# Patient Record
Sex: FEMALE | Race: WHITE | NOT HISPANIC OR LATINO | Employment: OTHER | URBAN - METROPOLITAN AREA
[De-identification: names, ages, dates, MRNs, and addresses within clinical notes are randomized per-mention and may not be internally consistent; named-entity substitution may affect disease eponyms.]

---

## 2021-05-26 ENCOUNTER — RECORDS - HEALTHEAST (OUTPATIENT)
Dept: ADMINISTRATIVE | Facility: CLINIC | Age: 66
End: 2021-05-26

## 2021-05-29 ENCOUNTER — RECORDS - HEALTHEAST (OUTPATIENT)
Dept: ADMINISTRATIVE | Facility: CLINIC | Age: 66
End: 2021-05-29

## 2021-07-01 ENCOUNTER — RECORDS - HEALTHEAST (OUTPATIENT)
Dept: LAB | Facility: CLINIC | Age: 66
End: 2021-07-01

## 2021-07-01 LAB
SARS-COV-2 PCR COMMENT: NORMAL
SARS-COV-2 RNA SPEC QL NAA+PROBE: NEGATIVE
SARS-COV-2 VIRUS SPECIMEN SOURCE: NORMAL

## 2021-07-02 LAB
ALBUMIN SERPL-MCNC: 3.5 G/DL (ref 3.5–5)
ALP SERPL-CCNC: 81 U/L (ref 45–120)
ALT SERPL W P-5'-P-CCNC: 32 U/L (ref 0–45)
ANION GAP SERPL CALCULATED.3IONS-SCNC: 11 MMOL/L (ref 5–18)
AST SERPL W P-5'-P-CCNC: 37 U/L (ref 0–40)
BILIRUB DIRECT SERPL-MCNC: 0.1 MG/DL
BILIRUB SERPL-MCNC: 0.3 MG/DL (ref 0–1)
BUN SERPL-MCNC: 15 MG/DL (ref 8–22)
CALCIUM SERPL-MCNC: 9.1 MG/DL (ref 8.5–10.5)
CHLORIDE BLD-SCNC: 105 MMOL/L (ref 98–107)
CO2 SERPL-SCNC: 26 MMOL/L (ref 22–31)
CREAT SERPL-MCNC: 0.73 MG/DL (ref 0.6–1.1)
ERYTHROCYTE [DISTWIDTH] IN BLOOD BY AUTOMATED COUNT: 13.3 % (ref 11–14.5)
GFR SERPL CREATININE-BSD FRML MDRD: >60 ML/MIN/1.73M2
GLUCOSE BLD-MCNC: 67 MG/DL (ref 70–125)
HCT VFR BLD AUTO: 34.4 % (ref 35–47)
HGB BLD-MCNC: 10.5 G/DL (ref 12–16)
MCH RBC QN AUTO: 29.2 PG (ref 27–34)
MCHC RBC AUTO-ENTMCNC: 30.5 G/DL (ref 32–36)
MCV RBC AUTO: 96 FL (ref 80–100)
PLATELET # BLD AUTO: 160 THOU/UL (ref 140–440)
PMV BLD AUTO: 12.1 FL (ref 8.5–12.5)
POTASSIUM BLD-SCNC: 3.2 MMOL/L (ref 3.5–5)
PROT SERPL-MCNC: 5.6 G/DL (ref 6–8)
RBC # BLD AUTO: 3.59 MILL/UL (ref 3.8–5.4)
SODIUM SERPL-SCNC: 142 MMOL/L (ref 136–145)
WBC: 6.9 THOU/UL (ref 4–11)

## 2021-07-04 LAB — 25(OH)D3 SERPL-MCNC: 32.3 NG/ML (ref 30–80)

## 2021-07-11 ENCOUNTER — LAB REQUISITION (OUTPATIENT)
Dept: LAB | Facility: CLINIC | Age: 66
End: 2021-07-11
Payer: COMMERCIAL

## 2021-07-11 DIAGNOSIS — I10 ESSENTIAL (PRIMARY) HYPERTENSION: ICD-10-CM

## 2022-06-15 NOTE — TELEPHONE ENCOUNTER
DIAGNOSIS: Spinal Stenosis / MRI at [facility], will have pushed over to [facility] / Self[employer]Friend / [employer]   APPOINTMENT DATE: 6.22.22   NOTES STATUS DETAILS   OFFICE NOTE from referring provider N/A    OFFICE NOTE from other specialist Care Everywhere 3.18.22 Willam, [facility]  12.13.21 Campe [facility]  11.2.21 Raquel, [facility]  10.29.19 Ron, [facility]  4.1.19 [facility] (scoliosis)  2.12.18, 2.5.18, 1.24.18 Beste, [facility]  9.5.17 Reut, [facility]  9.5.17 [facility]  More in CE if needed   DISCHARGE SUMMARY from hospital N/A    DISCHARGE REPORT from the ER Care Everywhere 6.24.21 [facility]   OPERATIVE REPORT N/A    MEDICATION LIST Care Everywhere    EMG (for Spine) N/A    IMPLANT RECORD/STICKER N/A    LABS     CBC/DIFF Care Everywhere    CULTURES N/A    INJECTIONS DONE IN RADIOLOGY N/A    Dexa In process 9.26.18 Bone denisty axil skeleton     MRI PACS 10.28.20 MR spine lumbar, [facility]  4.24.19 MR spine lumbar, [facility]  2.18.16 MR spine lumbar, [facility]  1.9.13 MR spine lumbar, [facility]     CT SCAN PACS 6.24.21 CT spine cervical, [facility]   XRAYS (IMAGES & REPORTS) PACS        [facility]: In Process [facility]  6.24.21 XR clavicle L  6.24.21 XR L shoulder  6.24.21 XR chest  4.1.19 XR spine cervical  4.1.19 XR spine thoracic  4.1.19 XR chest  9.30.15 XR spine lumbar  2.13.04 XR lumbar spine, [facility]  3.8.03 XR spine lumbar, [facility]   TUMOR     PATHOLOGY  Slides & report N/A      Action 6.15.22 MJ   Action Taken Sent request to [facility] and Johanna for all spine/back images.      Action June 21, 2022 3:27 PM MT   Action Taken CSS recvd and resolved imgs to PACS.      Action June 22, 2022 9:06 AM MT   Action Taken CSS called [facility] and [facility] for rep to push images STAT.

## 2022-06-16 DIAGNOSIS — M54.9 BACK PAIN: Primary | ICD-10-CM

## 2022-06-22 ENCOUNTER — PRE VISIT (OUTPATIENT)
Dept: ORTHOPEDICS | Facility: CLINIC | Age: 67
End: 2022-06-22
Payer: COMMERCIAL

## 2022-06-22 ENCOUNTER — ANCILLARY PROCEDURE (OUTPATIENT)
Dept: GENERAL RADIOLOGY | Facility: CLINIC | Age: 67
End: 2022-06-22
Attending: ORTHOPAEDIC SURGERY
Payer: COMMERCIAL

## 2022-06-22 ENCOUNTER — OFFICE VISIT (OUTPATIENT)
Dept: ORTHOPEDICS | Facility: CLINIC | Age: 67
End: 2022-06-22
Payer: COMMERCIAL

## 2022-06-22 VITALS — HEIGHT: 59 IN | WEIGHT: 146 LBS | BODY MASS INDEX: 29.43 KG/M2

## 2022-06-22 DIAGNOSIS — M41.25 OTHER IDIOPATHIC SCOLIOSIS, THORACOLUMBAR REGION: ICD-10-CM

## 2022-06-22 DIAGNOSIS — M54.16 LUMBAR RADICULOPATHY: ICD-10-CM

## 2022-06-22 DIAGNOSIS — M43.8X9 CORONAL PLANE IMBALANCE: ICD-10-CM

## 2022-06-22 DIAGNOSIS — M43.8X9 SAGITTAL PLANE IMBALANCE: ICD-10-CM

## 2022-06-22 DIAGNOSIS — F40.240 CLAUSTROPHOBIA: ICD-10-CM

## 2022-06-22 DIAGNOSIS — M54.16 LUMBAR RADICULOPATHY: Primary | ICD-10-CM

## 2022-06-22 PROCEDURE — 99205 OFFICE O/P NEW HI 60 MIN: CPT | Performed by: ORTHOPAEDIC SURGERY

## 2022-06-22 PROCEDURE — 77073 BONE LENGTH STUDIES: CPT | Performed by: STUDENT IN AN ORGANIZED HEALTH CARE EDUCATION/TRAINING PROGRAM

## 2022-06-22 PROCEDURE — 72082 X-RAY EXAM ENTIRE SPI 2/3 VW: CPT | Performed by: STUDENT IN AN ORGANIZED HEALTH CARE EDUCATION/TRAINING PROGRAM

## 2022-06-22 RX ORDER — NITROGLYCERIN 0.4 MG/1
TABLET SUBLINGUAL
COMMUNITY
Start: 2022-03-14

## 2022-06-22 RX ORDER — LANOLIN ALCOHOL/MO/W.PET/CERES
3 CREAM (GRAM) TOPICAL
COMMUNITY
Start: 2021-06-29

## 2022-06-22 RX ORDER — AMLODIPINE BESYLATE 5 MG/1
TABLET ORAL
COMMUNITY
Start: 2022-04-07

## 2022-06-22 RX ORDER — ATORVASTATIN CALCIUM 20 MG/1
TABLET, FILM COATED ORAL
COMMUNITY
Start: 2022-04-07

## 2022-06-22 RX ORDER — CYCLOBENZAPRINE HCL 10 MG
TABLET ORAL
COMMUNITY
Start: 2022-06-09

## 2022-06-22 RX ORDER — FERROUS SULFATE 325(65) MG
325 TABLET ORAL
COMMUNITY
Start: 2021-08-30

## 2022-06-22 RX ORDER — AMLODIPINE BESYLATE 5 MG/1
5 TABLET ORAL DAILY
COMMUNITY
Start: 2021-08-30

## 2022-06-22 NOTE — LETTER
6/22/2022         RE: Cynthia Chawla  [address]  [address]  [facility] [address]        Dear Colleague,    Thank you for referring your patient, Cynthia Chawla, to the [facility] [place]. Please see a copy of my visit note below.    Spine Surgery Consultation    REFERRING PHYSICIAN: Referred Self   PRIMARY CARE PHYSICIAN: Em Kaufman           Chief Complaint:   Consult (Spinal Stenosis low back pain referred by friend)      History of Present Illness:  Symptom Profile Including: location of symptoms, onset, severity, exacerbating/alleviating factors, previous treatments:        Cynthia Chawla is a 66 year old female who presents today for evaluation of chronic back pain and radicular burning pain in the right L4 and L5 distributions. She has longstanding scoliosis which was not treated.  She is a current smoker who is switching to nicotine replacement therapy. She is on methadone for chronic pain.  She reports pain for over seven years with progressively worsening symptoms over past one year.  She has had 2 injections; first was very helpful however the most recent injection provided no relief.  She has had acupuncture. She uses a scooter or walker to get around due to pain and sagittal imbalance making it difficult to walk without assistive device.     Patient denies saddle anesthesia, loss of bowel or bladder control.              Past Medical History:     Past Medical History:   Diagnosis Date     Chronic pain      COPD (chronic obstructive pulmonary disease) (H)      DJD (degenerative joint disease)      Hepatitis      Major depression, recurrent (H)      Sleep apnea     have C-PAP,But don't really use it.            Past Surgical History:     Past Surgical History:   Procedure Laterality Date     APPENDECTOMY       COLONOSCOPY       GYN SURGERY      tubal ligation     HEAD & NECK SURGERY      teeth ext     RECESSION RESECTION (REPAIR STRABISMUS) Right  10/22/2015    Procedure: RECESSION RESECTION (REPAIR STRABISMUS);  Surgeon: Hilario Hills MD;  Location: [facility]            Social History:     Social History     Tobacco Use     Smoking status: Former Smoker     Packs/day: 1.00     Types: Cigarettes     Quit date: 2013     Years since quittin.4     Smokeless tobacco: Never Used   Substance Use Topics     Alcohol use: No            Family History:     Family History   Problem Relation Age of Onset     Cardiovascular Mother      Cardiovascular Maternal Grandfather      Asthma Son             Allergies:     Allergies   Allergen Reactions     Nefazodone      Nsaids      Has GFR <60     Tramadol      Seizures     Trazodone             Medications:     Current Outpatient Medications   Medication     Acetaminophen (TYLENOL PO)     albuterol (PROAIR HFA, PROVENTIL HFA, VENTOLIN HFA) 108 (90 BASE) MCG/ACT inhaler     amLODIPine (NORVASC) 5 MG tablet     amLODIPine (NORVASC) 5 MG tablet     atorvastatin (LIPITOR) 20 MG tablet     calcium carb 1250 mg, 500 mg [place],/vitamin D 200 units (OSCAL WITH D) 500-200 MG-UNIT per tablet     cyclobenzaprine (FLEXERIL) 10 MG tablet     ferrous sulfate (FEROSUL) 325 (65 Fe) MG tablet     Gabapentin (NEURONTIN PO)     IBUPROFEN 800 MG OR TABS     melatonin 3 MG tablet     Methadone HCl (METHADOSE PO)     nitroGLYcerin (NITROSTAT) 0.4 MG sublingual tablet     VITAMIN D, CHOLECALCIFEROL, PO     BuPROPion HCl (WELLBUTRIN XL PO)     desonide (DESOWEN) 0.05 % ointment     diazepam (VALIUM) 5 MG tablet     ipratropium - albuterol 0.5 mg/2.5 mg/3 mL (DUONEB) 0.5-2.5 (3) MG/3ML nebulization     mometasone-formoterol (DULERA) 200-5 MCG/ACT oral inhaler     XANAX 1 MG OR TABS     No current facility-administered medications for this visit.             Review of Systems:     A 10 point ROS was performed and reviewed. Specific responses to these questions are noted at the end of the document.         Physical Exam:     PHYSICAL  "EXAM:   Constitutional - Patient is healthy, well-nourished and appears stated age, is in no acute distress    Vitals: Ht 1.506 m (4' 11.29\")   Wt 66.2 kg (146 lb)   LMP 04/20/2008   BMI 29.20 kg/m     She has obvious rightward coronal imbalance and markedly positive sagittal balance. She does not have any focal weakness in lower extremities. She has subjective decreased sensation in right L4 and L5. Reflexes are 1+ patella tendon and achilles tendon. No sustained clonus. Negative SLR. She doesn't walk without walker. Can't walk on heels or toes.          Imaging:   We ordered and independently reviewed new radiographs at this clinic visit. The results were discussed with the patient.  Findings include:    EOS full length radiographs show a severe short lumbar scoliosis at L1-4 measuring 123 degrees. Compensatory thoracic curve of 41 degrees. Coronal imbalance of 113 mm. Lateral shows acute kyphotic deformity at thoracolumbar junction measuring 50 degrees. 13 cm positive sagittal balance.                  Assessment and Plan:   Assessment:  66 year old female with scoliosis and decompensated sagittal and coronal imbalance. She has radicular symptoms as well as marked disability due to her decompensated imbalance. Recommended MRI to evaluate for focal compressive lesion which may be amenable to nonoperative intervention or smaller decompression surgery if she fails conservative cares. Her sagittal and coronal imbalance is marked and she would need a great deal of optimization were she to desire pursuing deformity reconstruction. This would include bone health, nutrition, tobacco, therapy     Plan:  1. MRI  2. Will help coordinate nonoperative cares for radic  3. Will follow up in 2 weeks to review imaging findings.     Time spent on this clinical encounter including previsit chart review, history and physical examination, patient counseling and documentation was 45 minutes on the date of " encounter.          Respectfully,  Marco A Adair MD  Spine Surgery  [facility]

## 2022-06-22 NOTE — NURSING NOTE
"Reason For Visit:   Chief Complaint   Patient presents with     Consult     Spinal Stenosis low back pain referred by friend       Primary MD: Anay Quarles  Ref. MD: Self referred    ?  No    Date of injury: no  Type of injury: no.    Date of surgery: no  Type of surgery: no.    Smoker: Yes  Request smoking cessation information: No    Ht 1.506 m (4' 11.29\")   Wt 66.2 kg (146 lb)   LMP 04/20/2008   BMI 29.20 kg/m      Pain Assessment  Patient Currently in Pain: Yes  0-10 Pain Scale: 5  Primary Pain Location: Back    Oswestry (ANDREAS) Questionnaire    OSWESTRY DISABILITY INDEX 6/22/2022   Count 9   Sum 34   Oswestry Score (%) 75.56   Some recent data might be hidden        Visual Analog Pain Scale  Back Pain Scale 0-10: 5  Right leg pain: 6  Left leg pain: 0  Neck Pain Scale 0-10: 0  Right arm pain: 0  Left arm pain: 0    Promis 10 Assessment    No flowsheet data found.             Monika Wise LPN  "

## 2022-06-22 NOTE — TELEPHONE ENCOUNTER
Patient was seen today with Dr. Adair. Patient will need MRI.  Patient has claustrophobia. Need valium before procedure.  RN send script to [facility] to sign and authorize.  [facility].    Viraj Marina RN

## 2022-06-23 RX ORDER — DIAZEPAM 5 MG
TABLET ORAL
Qty: 1 TABLET | Refills: 0 | Status: SHIPPED | OUTPATIENT
Start: 2022-06-23 | End: 2022-08-29

## 2022-06-27 PROBLEM — M41.25 OTHER IDIOPATHIC SCOLIOSIS, THORACOLUMBAR REGION: Status: ACTIVE | Noted: 2022-06-27

## 2022-06-27 PROBLEM — M43.8X9 SAGITTAL PLANE IMBALANCE: Status: ACTIVE | Noted: 2022-06-27

## 2022-06-27 PROBLEM — M43.8X9: Status: ACTIVE | Noted: 2022-06-27

## 2022-06-27 PROBLEM — M54.16 LUMBAR RADICULOPATHY: Status: ACTIVE | Noted: 2022-06-27

## 2022-06-27 NOTE — PROGRESS NOTES
Spine Surgery Consultation    REFERRING PHYSICIAN: Referred Self   PRIMARY CARE PHYSICIAN: Em Kaufman           Chief Complaint:   Consult (Spinal Stenosis low back pain referred by friend)      History of Present Illness:  Symptom Profile Including: location of symptoms, onset, severity, exacerbating/alleviating factors, previous treatments:        Cynthia Chawla is a 66 year old female who presents today for evaluation of chronic back pain and radicular burning pain in the right L4 and L5 distributions. She has longstanding scoliosis which was not treated.  She is a current smoker who is switching to nicotine replacement therapy. She is on methadone for chronic pain.  She reports pain for over seven years with progressively worsening symptoms over past one year.  She has had 2 injections; first was very helpful however the most recent injection provided no relief.  She has had acupuncture. She uses a scooter or walker to get around due to pain and sagittal imbalance making it difficult to walk without assistive device.     Patient denies saddle anesthesia, loss of bowel or bladder control.              Past Medical History:     Past Medical History:   Diagnosis Date     Chronic pain      COPD (chronic obstructive pulmonary disease) (H)      DJD (degenerative joint disease)      Hepatitis      Major depression, recurrent (H)      Sleep apnea     have C-PAP,But don't really use it.            Past Surgical History:     Past Surgical History:   Procedure Laterality Date     APPENDECTOMY       COLONOSCOPY       GYN SURGERY      tubal ligation     HEAD & NECK SURGERY      teeth ext     RECESSION RESECTION (REPAIR STRABISMUS) Right 10/22/2015    Procedure: RECESSION RESECTION (REPAIR STRABISMUS);  Surgeon: Hilario Hills MD;  Location: [facility]            Social History:     Social History     Tobacco Use     Smoking status: Former Smoker     Packs/day: 1.00     Types: Cigarettes     Quit date: 1/1/2013  "    Years since quittin.4     Smokeless tobacco: Never Used   Substance Use Topics     Alcohol use: No            Family History:     Family History   Problem Relation Age of Onset     Cardiovascular Mother      Cardiovascular Maternal Grandfather      Asthma Son             Allergies:     Allergies   Allergen Reactions     Nefazodone      Nsaids      Has GFR <60     Tramadol      Seizures     Trazodone             Medications:     Current Outpatient Medications   Medication     Acetaminophen (TYLENOL PO)     albuterol (PROAIR HFA, PROVENTIL HFA, VENTOLIN HFA) 108 (90 BASE) MCG/ACT inhaler     amLODIPine (NORVASC) 5 MG tablet     amLODIPine (NORVASC) 5 MG tablet     atorvastatin (LIPITOR) 20 MG tablet     calcium carb 1250 mg, 500 mg [place],/vitamin D 200 units (OSCAL WITH D) 500-200 MG-UNIT per tablet     cyclobenzaprine (FLEXERIL) 10 MG tablet     ferrous sulfate (FEROSUL) 325 (65 Fe) MG tablet     Gabapentin (NEURONTIN PO)     IBUPROFEN 800 MG OR TABS     melatonin 3 MG tablet     Methadone HCl (METHADOSE PO)     nitroGLYcerin (NITROSTAT) 0.4 MG sublingual tablet     VITAMIN D, CHOLECALCIFEROL, PO     BuPROPion HCl (WELLBUTRIN XL PO)     desonide (DESOWEN) 0.05 % ointment     diazepam (VALIUM) 5 MG tablet     ipratropium - albuterol 0.5 mg/2.5 mg/3 mL (DUONEB) 0.5-2.5 (3) MG/3ML nebulization     mometasone-formoterol (DULERA) 200-5 MCG/ACT oral inhaler     XANAX 1 MG OR TABS     No current facility-administered medications for this visit.             Review of Systems:     A 10 point ROS was performed and reviewed. Specific responses to these questions are noted at the end of the document.         Physical Exam:     PHYSICAL EXAM:   Constitutional - Patient is healthy, well-nourished and appears stated age, is in no acute distress    Vitals: Ht 1.506 m (4' 11.29\")   Wt 66.2 kg (146 lb)   LMP 2008   BMI 29.20 kg/m     She has obvious rightward coronal imbalance and markedly positive sagittal balance. " She does not have any focal weakness in lower extremities. She has subjective decreased sensation in right L4 and L5. Reflexes are 1+ patella tendon and achilles tendon. No sustained clonus. Negative SLR. She doesn't walk without walker. Can't walk on heels or toes.          Imaging:   We ordered and independently reviewed new radiographs at this clinic visit. The results were discussed with the patient.  Findings include:    EOS full length radiographs show a severe short lumbar scoliosis at L1-4 measuring 123 degrees. Compensatory thoracic curve of 41 degrees. Coronal imbalance of 113 mm. Lateral shows acute kyphotic deformity at thoracolumbar junction measuring 50 degrees. 13 cm positive sagittal balance.                  Assessment and Plan:   Assessment:  66 year old female with scoliosis and decompensated sagittal and coronal imbalance. She has radicular symptoms as well as marked disability due to her decompensated imbalance. Recommended MRI to evaluate for focal compressive lesion which may be amenable to nonoperative intervention or smaller decompression surgery if she fails conservative cares. Her sagittal and coronal imbalance is marked and she would need a great deal of optimization were she to desire pursuing deformity reconstruction. This would include bone health, nutrition, tobacco, therapy     Plan:  1. MRI  2. Will help coordinate nonoperative cares for radic  3. Will follow up in 2 weeks to review imaging findings.     Time spent on this clinical encounter including previsit chart review, history and physical examination, patient counseling and documentation was 45 minutes on the date of encounter.          Respectfully,  Marco A Adair MD  Spine Surgery  [facility]

## 2022-07-08 ENCOUNTER — TELEPHONE (OUTPATIENT)
Dept: ORTHOPEDICS | Facility: CLINIC | Age: 67
End: 2022-07-08

## 2022-07-08 NOTE — TELEPHONE ENCOUNTER
Writer called and talked with patient on the phone and rescheduled apt with provider on 7/20/22 to 7/25/22 at 2:20 pm. Provider will be out of clinic on 7/20/22.    Monika Wise LPN

## 2022-07-20 ENCOUNTER — HOSPITAL ENCOUNTER (OUTPATIENT)
Dept: GENERAL RADIOLOGY | Facility: CLINIC | Age: 67
Discharge: HOME | End: 2022-07-20
Attending: ORTHOPAEDIC SURGERY
Payer: COMMERCIAL

## 2022-07-20 ENCOUNTER — ANCILLARY PROCEDURE (OUTPATIENT)
Dept: CARDIOLOGY | Facility: CLINIC | Age: 67
End: 2022-07-20
Attending: INTERNAL MEDICINE
Payer: COMMERCIAL

## 2022-07-20 ENCOUNTER — HOSPITAL ENCOUNTER (OUTPATIENT)
Dept: MRI IMAGING | Facility: CLINIC | Age: 67
Discharge: HOME | End: 2022-07-20
Attending: ORTHOPAEDIC SURGERY
Payer: COMMERCIAL

## 2022-07-20 VITALS — OXYGEN SATURATION: 91 % | HEART RATE: 84 BPM

## 2022-07-20 DIAGNOSIS — Z45.02 ENCOUNTER FOR ADJUSTMENT AND MANAGEMENT OF AUTOMATIC IMPLANTABLE CARDIAC DEFIBRILLATOR: ICD-10-CM

## 2022-07-20 DIAGNOSIS — I44.2 ATRIOVENTRICULAR BLOCK, COMPLETE (H): Primary | ICD-10-CM

## 2022-07-20 DIAGNOSIS — M54.16 LUMBAR RADICULOPATHY: ICD-10-CM

## 2022-07-20 DIAGNOSIS — M41.25 OTHER IDIOPATHIC SCOLIOSIS, THORACOLUMBAR REGION: ICD-10-CM

## 2022-07-20 DIAGNOSIS — I44.2 ATRIOVENTRICULAR BLOCK, COMPLETE (H): ICD-10-CM

## 2022-07-20 LAB
MDC_IDC_LEAD_IMPLANT_DT: NORMAL
MDC_IDC_LEAD_LOCATION: NORMAL
MDC_IDC_LEAD_LOCATION_DETAIL_1: NORMAL
MDC_IDC_LEAD_MFG: NORMAL
MDC_IDC_LEAD_MODEL: NORMAL
MDC_IDC_LEAD_POLARITY_TYPE: NORMAL
MDC_IDC_LEAD_SERIAL: NORMAL
MDC_IDC_LEAD_SPECIAL_FUNCTION: NORMAL
MDC_IDC_MSMT_BATTERY_DTM: NORMAL
MDC_IDC_MSMT_BATTERY_DTM: NORMAL
MDC_IDC_MSMT_BATTERY_REMAINING_LONGEVITY: 107 MO
MDC_IDC_MSMT_BATTERY_REMAINING_LONGEVITY: 109 MO
MDC_IDC_MSMT_BATTERY_RRT_TRIGGER: 2.62
MDC_IDC_MSMT_BATTERY_RRT_TRIGGER: 2.62
MDC_IDC_MSMT_BATTERY_STATUS: NORMAL
MDC_IDC_MSMT_BATTERY_STATUS: NORMAL
MDC_IDC_MSMT_BATTERY_VOLTAGE: 3 V
MDC_IDC_MSMT_BATTERY_VOLTAGE: 3 V
MDC_IDC_MSMT_LEADCHNL_RA_IMPEDANCE_VALUE: 285 OHM
MDC_IDC_MSMT_LEADCHNL_RA_IMPEDANCE_VALUE: 304 OHM
MDC_IDC_MSMT_LEADCHNL_RA_IMPEDANCE_VALUE: 342 OHM
MDC_IDC_MSMT_LEADCHNL_RA_IMPEDANCE_VALUE: 342 OHM
MDC_IDC_MSMT_LEADCHNL_RA_PACING_THRESHOLD_AMPLITUDE: 0.5 V
MDC_IDC_MSMT_LEADCHNL_RA_PACING_THRESHOLD_AMPLITUDE: 0.75 V
MDC_IDC_MSMT_LEADCHNL_RA_PACING_THRESHOLD_PULSEWIDTH: 0.4 MS
MDC_IDC_MSMT_LEADCHNL_RA_PACING_THRESHOLD_PULSEWIDTH: 0.4 MS
MDC_IDC_MSMT_LEADCHNL_RA_SENSING_INTR_AMPL: 1.6 MV
MDC_IDC_MSMT_LEADCHNL_RA_SENSING_INTR_AMPL: 2.1 MV
MDC_IDC_MSMT_LEADCHNL_RV_IMPEDANCE_VALUE: 323 OHM
MDC_IDC_MSMT_LEADCHNL_RV_IMPEDANCE_VALUE: 361 OHM
MDC_IDC_MSMT_LEADCHNL_RV_IMPEDANCE_VALUE: 380 OHM
MDC_IDC_MSMT_LEADCHNL_RV_IMPEDANCE_VALUE: 418 OHM
MDC_IDC_MSMT_LEADCHNL_RV_PACING_THRESHOLD_AMPLITUDE: 1 V
MDC_IDC_MSMT_LEADCHNL_RV_PACING_THRESHOLD_AMPLITUDE: 1 V
MDC_IDC_MSMT_LEADCHNL_RV_PACING_THRESHOLD_PULSEWIDTH: 0.4 MS
MDC_IDC_MSMT_LEADCHNL_RV_PACING_THRESHOLD_PULSEWIDTH: 0.4 MS
MDC_IDC_PG_IMPLANT_DTM: NORMAL
MDC_IDC_PG_IMPLANT_DTM: NORMAL
MDC_IDC_PG_MFG: NORMAL
MDC_IDC_PG_MFG: NORMAL
MDC_IDC_PG_MODEL: NORMAL
MDC_IDC_PG_MODEL: NORMAL
MDC_IDC_PG_SERIAL: NORMAL
MDC_IDC_PG_SERIAL: NORMAL
MDC_IDC_PG_TYPE: NORMAL
MDC_IDC_PG_TYPE: NORMAL
MDC_IDC_SESS_CLINIC_NAME: NORMAL
MDC_IDC_SESS_CLINIC_NAME: NORMAL
MDC_IDC_SESS_DTM: NORMAL
MDC_IDC_SESS_DTM: NORMAL
MDC_IDC_SESS_TYPE: NORMAL
MDC_IDC_SESS_TYPE: NORMAL
MDC_IDC_SET_BRADY_AT_MODE_SWITCH_RATE: 171 {BEATS}/MIN
MDC_IDC_SET_BRADY_AT_MODE_SWITCH_RATE: 171 {BEATS}/MIN
MDC_IDC_SET_BRADY_HYSTRATE: NORMAL
MDC_IDC_SET_BRADY_HYSTRATE: NORMAL
MDC_IDC_SET_BRADY_LOWRATE: 60 {BEATS}/MIN
MDC_IDC_SET_BRADY_LOWRATE: 60 {BEATS}/MIN
MDC_IDC_SET_BRADY_MAX_SENSOR_RATE: 130 {BEATS}/MIN
MDC_IDC_SET_BRADY_MAX_SENSOR_RATE: 130 {BEATS}/MIN
MDC_IDC_SET_BRADY_MAX_TRACKING_RATE: 130 {BEATS}/MIN
MDC_IDC_SET_BRADY_MAX_TRACKING_RATE: 130 {BEATS}/MIN
MDC_IDC_SET_BRADY_MODE: NORMAL
MDC_IDC_SET_BRADY_MODE: NORMAL
MDC_IDC_SET_BRADY_PAV_DELAY_LOW: 210 MS
MDC_IDC_SET_BRADY_PAV_DELAY_LOW: 210 MS
MDC_IDC_SET_BRADY_SAV_DELAY_LOW: 180 MS
MDC_IDC_SET_BRADY_SAV_DELAY_LOW: 180 MS
MDC_IDC_SET_LEADCHNL_RA_PACING_AMPLITUDE: 1.5 V
MDC_IDC_SET_LEADCHNL_RA_PACING_AMPLITUDE: 1.5 V
MDC_IDC_SET_LEADCHNL_RA_PACING_ANODE_ELECTRODE_1: NORMAL
MDC_IDC_SET_LEADCHNL_RA_PACING_ANODE_ELECTRODE_1: NORMAL
MDC_IDC_SET_LEADCHNL_RA_PACING_ANODE_LOCATION_1: NORMAL
MDC_IDC_SET_LEADCHNL_RA_PACING_ANODE_LOCATION_1: NORMAL
MDC_IDC_SET_LEADCHNL_RA_PACING_CAPTURE_MODE: NORMAL
MDC_IDC_SET_LEADCHNL_RA_PACING_CAPTURE_MODE: NORMAL
MDC_IDC_SET_LEADCHNL_RA_PACING_CATHODE_ELECTRODE_1: NORMAL
MDC_IDC_SET_LEADCHNL_RA_PACING_CATHODE_ELECTRODE_1: NORMAL
MDC_IDC_SET_LEADCHNL_RA_PACING_CATHODE_LOCATION_1: NORMAL
MDC_IDC_SET_LEADCHNL_RA_PACING_CATHODE_LOCATION_1: NORMAL
MDC_IDC_SET_LEADCHNL_RA_PACING_POLARITY: NORMAL
MDC_IDC_SET_LEADCHNL_RA_PACING_POLARITY: NORMAL
MDC_IDC_SET_LEADCHNL_RA_PACING_PULSEWIDTH: 0.4 MS
MDC_IDC_SET_LEADCHNL_RA_PACING_PULSEWIDTH: 0.4 MS
MDC_IDC_SET_LEADCHNL_RA_SENSING_ANODE_ELECTRODE_1: NORMAL
MDC_IDC_SET_LEADCHNL_RA_SENSING_ANODE_ELECTRODE_1: NORMAL
MDC_IDC_SET_LEADCHNL_RA_SENSING_ANODE_LOCATION_1: NORMAL
MDC_IDC_SET_LEADCHNL_RA_SENSING_ANODE_LOCATION_1: NORMAL
MDC_IDC_SET_LEADCHNL_RA_SENSING_CATHODE_ELECTRODE_1: NORMAL
MDC_IDC_SET_LEADCHNL_RA_SENSING_CATHODE_ELECTRODE_1: NORMAL
MDC_IDC_SET_LEADCHNL_RA_SENSING_CATHODE_LOCATION_1: NORMAL
MDC_IDC_SET_LEADCHNL_RA_SENSING_CATHODE_LOCATION_1: NORMAL
MDC_IDC_SET_LEADCHNL_RA_SENSING_POLARITY: NORMAL
MDC_IDC_SET_LEADCHNL_RA_SENSING_POLARITY: NORMAL
MDC_IDC_SET_LEADCHNL_RA_SENSING_SENSITIVITY: 0.3 MV
MDC_IDC_SET_LEADCHNL_RA_SENSING_SENSITIVITY: 0.3 MV
MDC_IDC_SET_LEADCHNL_RV_PACING_AMPLITUDE: 2 V
MDC_IDC_SET_LEADCHNL_RV_PACING_AMPLITUDE: 2 V
MDC_IDC_SET_LEADCHNL_RV_PACING_ANODE_ELECTRODE_1: NORMAL
MDC_IDC_SET_LEADCHNL_RV_PACING_ANODE_ELECTRODE_1: NORMAL
MDC_IDC_SET_LEADCHNL_RV_PACING_ANODE_LOCATION_1: NORMAL
MDC_IDC_SET_LEADCHNL_RV_PACING_ANODE_LOCATION_1: NORMAL
MDC_IDC_SET_LEADCHNL_RV_PACING_CAPTURE_MODE: NORMAL
MDC_IDC_SET_LEADCHNL_RV_PACING_CAPTURE_MODE: NORMAL
MDC_IDC_SET_LEADCHNL_RV_PACING_CATHODE_ELECTRODE_1: NORMAL
MDC_IDC_SET_LEADCHNL_RV_PACING_CATHODE_ELECTRODE_1: NORMAL
MDC_IDC_SET_LEADCHNL_RV_PACING_CATHODE_LOCATION_1: NORMAL
MDC_IDC_SET_LEADCHNL_RV_PACING_CATHODE_LOCATION_1: NORMAL
MDC_IDC_SET_LEADCHNL_RV_PACING_POLARITY: NORMAL
MDC_IDC_SET_LEADCHNL_RV_PACING_POLARITY: NORMAL
MDC_IDC_SET_LEADCHNL_RV_PACING_PULSEWIDTH: 0.4 MS
MDC_IDC_SET_LEADCHNL_RV_PACING_PULSEWIDTH: 0.4 MS
MDC_IDC_SET_LEADCHNL_RV_SENSING_ANODE_ELECTRODE_1: NORMAL
MDC_IDC_SET_LEADCHNL_RV_SENSING_ANODE_ELECTRODE_1: NORMAL
MDC_IDC_SET_LEADCHNL_RV_SENSING_ANODE_LOCATION_1: NORMAL
MDC_IDC_SET_LEADCHNL_RV_SENSING_ANODE_LOCATION_1: NORMAL
MDC_IDC_SET_LEADCHNL_RV_SENSING_CATHODE_ELECTRODE_1: NORMAL
MDC_IDC_SET_LEADCHNL_RV_SENSING_CATHODE_ELECTRODE_1: NORMAL
MDC_IDC_SET_LEADCHNL_RV_SENSING_CATHODE_LOCATION_1: NORMAL
MDC_IDC_SET_LEADCHNL_RV_SENSING_CATHODE_LOCATION_1: NORMAL
MDC_IDC_SET_LEADCHNL_RV_SENSING_POLARITY: NORMAL
MDC_IDC_SET_LEADCHNL_RV_SENSING_POLARITY: NORMAL
MDC_IDC_SET_LEADCHNL_RV_SENSING_SENSITIVITY: 0.9 MV
MDC_IDC_SET_LEADCHNL_RV_SENSING_SENSITIVITY: 0.9 MV
MDC_IDC_SET_ZONE_DETECTION_INTERVAL: 350 MS
MDC_IDC_SET_ZONE_DETECTION_INTERVAL: 350 MS
MDC_IDC_SET_ZONE_DETECTION_INTERVAL: 400 MS
MDC_IDC_SET_ZONE_DETECTION_INTERVAL: 400 MS
MDC_IDC_SET_ZONE_TYPE: NORMAL
MDC_IDC_STAT_AT_BURDEN_PERCENT: 0 %
MDC_IDC_STAT_AT_BURDEN_PERCENT: 0 %
MDC_IDC_STAT_AT_DTM_END: NORMAL
MDC_IDC_STAT_AT_DTM_END: NORMAL
MDC_IDC_STAT_AT_DTM_START: NORMAL
MDC_IDC_STAT_AT_DTM_START: NORMAL
MDC_IDC_STAT_BRADY_AP_VP_PERCENT: 21.59 %
MDC_IDC_STAT_BRADY_AP_VP_PERCENT: 21.59 %
MDC_IDC_STAT_BRADY_AP_VS_PERCENT: 0 %
MDC_IDC_STAT_BRADY_AP_VS_PERCENT: 0 %
MDC_IDC_STAT_BRADY_AS_VP_PERCENT: 78.39 %
MDC_IDC_STAT_BRADY_AS_VP_PERCENT: 78.39 %
MDC_IDC_STAT_BRADY_AS_VS_PERCENT: 0.01 %
MDC_IDC_STAT_BRADY_AS_VS_PERCENT: 0.01 %
MDC_IDC_STAT_BRADY_DTM_END: NORMAL
MDC_IDC_STAT_BRADY_DTM_END: NORMAL
MDC_IDC_STAT_BRADY_DTM_START: NORMAL
MDC_IDC_STAT_BRADY_DTM_START: NORMAL
MDC_IDC_STAT_BRADY_RA_PERCENT_PACED: 21.58 %
MDC_IDC_STAT_BRADY_RA_PERCENT_PACED: 21.58 %
MDC_IDC_STAT_BRADY_RV_PERCENT_PACED: 99.98 %
MDC_IDC_STAT_BRADY_RV_PERCENT_PACED: 99.98 %
MDC_IDC_STAT_EPISODE_RECENT_COUNT: 0
MDC_IDC_STAT_EPISODE_RECENT_COUNT_DTM_END: NORMAL
MDC_IDC_STAT_EPISODE_RECENT_COUNT_DTM_START: NORMAL
MDC_IDC_STAT_EPISODE_TOTAL_COUNT: 0
MDC_IDC_STAT_EPISODE_TOTAL_COUNT: 1
MDC_IDC_STAT_EPISODE_TOTAL_COUNT: 1
MDC_IDC_STAT_EPISODE_TOTAL_COUNT_DTM_END: NORMAL
MDC_IDC_STAT_EPISODE_TOTAL_COUNT_DTM_START: NORMAL
MDC_IDC_STAT_EPISODE_TYPE: NORMAL

## 2022-07-20 PROCEDURE — 93286 PERI-PX EVAL PM/LDLS PM IP: CPT

## 2022-07-20 PROCEDURE — 93286 PERI-PX EVAL PM/LDLS PM IP: CPT | Mod: 26 | Performed by: INTERNAL MEDICINE

## 2022-07-20 PROCEDURE — 72148 MRI LUMBAR SPINE W/O DYE: CPT

## 2022-07-20 PROCEDURE — 71046 X-RAY EXAM CHEST 2 VIEWS: CPT

## 2022-07-20 PROCEDURE — 71046 X-RAY EXAM CHEST 2 VIEWS: CPT | Mod: 26 | Performed by: RADIOLOGY

## 2022-07-20 PROCEDURE — 93280 PM DEVICE PROGR EVAL DUAL: CPT

## 2022-07-20 PROCEDURE — 72148 MRI LUMBAR SPINE W/O DYE: CPT | Mod: 26 | Performed by: RADIOLOGY

## 2022-07-20 NOTE — PROGRESS NOTES
Patient monitored while in MRI. No complications observed or reported throughout scan.    Benigno Michel RN

## 2022-07-25 ENCOUNTER — VIRTUAL VISIT (OUTPATIENT)
Dept: ORTHOPEDICS | Facility: CLINIC | Age: 67
End: 2022-07-25
Payer: COMMERCIAL

## 2022-07-25 ENCOUNTER — TELEPHONE (OUTPATIENT)
Dept: ORTHOPEDICS | Facility: CLINIC | Age: 67
End: 2022-07-25

## 2022-07-25 DIAGNOSIS — M54.16 LUMBAR RADICULOPATHY: Primary | ICD-10-CM

## 2022-07-25 DIAGNOSIS — M48.061 SPINAL STENOSIS OF LUMBAR REGION, UNSPECIFIED WHETHER NEUROGENIC CLAUDICATION PRESENT: ICD-10-CM

## 2022-07-25 PROCEDURE — 99213 OFFICE O/P EST LOW 20 MIN: CPT | Mod: 95 | Performed by: ORTHOPAEDIC SURGERY

## 2022-07-25 NOTE — TELEPHONE ENCOUNTER
Writer called and talked with patient on the phone. Writer asked that patient call the clinic once they get their injection scheduled and writer will then schedule a two week follow up after that. Patient agreed to plan.     Monika Wise LPN

## 2022-07-25 NOTE — LETTER
7/25/2022         RE: Cynthia Chawla  [address]  [address]  [place] [address]        Dear Colleague,    Thank you for referring your patient, Cynthia Chawla, to the [facility]. Please see a copy of my visit note below.    I have reviewed and updated the patient's Past Medical History, Social History, Family History and Medication List.    ALLERGIES  Nefazodone, Nsaids, Tramadol, and Trazodone    Spine Surgery Follow Up    REFERRING PHYSICIAN: No ref. provider found   PRIMARY CARE PHYSICIAN: Em Kaufman           Chief Complaint:   RECHECK (phone visit [phone], MRI results date of MRI 07/13)      History of Present Illness:  Symptom Profile Including: location of symptoms, onset, severity, exacerbating/alleviating factors, previous treatments:        Cynthia Chawla is a 66 year old female with idiopathic scoliosis, sagittal and coronal plane imbalance and severe right-sided lumbar radiculopathy who presents today for routine follow up to discuss results of recent MRI of lumbar spine.  She reports her symptoms continue be exclusively right-sided in the L4 and L5 distributions.  She does state that she has had some improvement since I last saw her but continues to have severe pain overall.  No new issues with cauda equina syndrome        ANDREAS Scores:  Oswestry (ANDREAS) Questionnaire    OSWESTRY DISABILITY INDEX 6/22/2022   Count 9   Sum 34   Oswestry Score (%) 75.56   Some recent data might be hidden            Neck Disability Index (NDI) Questionnaire    No flowsheet data found.              PROMIS-10 Scores:                  Physical Exam:   PHYSICAL EXAM:  Telephone visit so no physical exam today         Imaging:   I ordered and independently reviewed new radiographs at this clinic visit. The results were discussed with the patient.  Findings include:  MRI of the lumbar spine was reviewed and interpreted by myself and also reviewed with the patient.  There is multilevel  spondylosis with severe coronal deformity.  There is level stenosis including severe foraminal stenosis right-sided L3-4, L4-5, L5-S1.             Assessment and Plan:   Assessment:  66 year old female with scoliosis, fixed sagittal deformity and fixed coronal deformity, radiculopathy of L4 and L5 distributions on right. I discussed these findings with patient and recommended TFESI at L4-5 and L3-4. Counseled patient regarding monitoring her pain prior to injection, 1 hour after injection, 2 weeks after injection as these timeframes provide important information regarding response to injection and potential future treatments. She was understanding of this. Referral has been placed. Will see patient back in clinic two-three weeks after injection.      Respectfully,  Marco A Adair MD  Orthopaedic Spine Surgery  Dept Orthopaedic Surgery, [facility] Phone: [phone]    Dictation Disclaimer: Some of this Note has been completed with voice-recognition dictation software. Although errors are generally corrected real-time, there is the potential for a rare error to be present in the completed chart.    Time spent on this clinical encounter including previsit chart review, history and physical examination, patient counseling and documentation was 21 minutes on the date of encounter. Greater than 50% was spent in patient counseling.

## 2022-07-25 NOTE — PROGRESS NOTES
I have reviewed and updated the patient's Past Medical History, Social History, Family History and Medication List.    ALLERGIES  Nefazodone, Nsaids, Tramadol, and Trazodone    Spine Surgery Follow Up    REFERRING PHYSICIAN: No ref. provider found   PRIMARY CARE PHYSICIAN: Em Kaufman           Chief Complaint:   RECHECK (phone visit [phone], MRI results date of MRI 07/13)      History of Present Illness:  Symptom Profile Including: location of symptoms, onset, severity, exacerbating/alleviating factors, previous treatments:        Cynthia Chawla is a 66 year old female with idiopathic scoliosis, sagittal and coronal plane imbalance and severe right-sided lumbar radiculopathy who presents today for routine follow up to discuss results of recent MRI of lumbar spine.  She reports her symptoms continue be exclusively right-sided in the L4 and L5 distributions.  She does state that she has had some improvement since I last saw her but continues to have severe pain overall.  No new issues with cauda equina syndrome        ANDREAS Scores:  Oswestry (ANDREAS) Questionnaire    OSWESTRY DISABILITY INDEX 6/22/2022   Count 9   Sum 34   Oswestry Score (%) 75.56   Some recent data might be hidden            Neck Disability Index (NDI) Questionnaire    No flowsheet data found.              PROMIS-10 Scores:                  Physical Exam:   PHYSICAL EXAM:  Telephone visit so no physical exam today         Imaging:   I ordered and independently reviewed new radiographs at this clinic visit. The results were discussed with the patient.  Findings include:  MRI of the lumbar spine was reviewed and interpreted by myself and also reviewed with the patient.  There is multilevel spondylosis with severe coronal deformity.  There is level stenosis including severe foraminal stenosis right-sided L3-4, L4-5, L5-S1.             Assessment and Plan:   Assessment:  66 year old female with scoliosis, fixed sagittal deformity and fixed  coronal deformity, radiculopathy of L4 and L5 distributions on right. I discussed these findings with patient and recommended TFESI at L4-5 and L3-4. Counseled patient regarding monitoring her pain prior to injection, 1 hour after injection, 2 weeks after injection as these timeframes provide important information regarding response to injection and potential future treatments. She was understanding of this. Referral has been placed. Will see patient back in clinic two-three weeks after injection.      Respectfully,  Marco A Adair MD  Orthopaedic Spine Surgery  Dept Orthopaedic Surgery, [facility]  [facility] Phone: [phone]    Dictation Disclaimer: Some of this Note has been completed with voice-recognition dictation software. Although errors are generally corrected real-time, there is the potential for a rare error to be present in the completed chart.    Time spent on this clinical encounter including previsit chart review, history and physical examination, patient counseling and documentation was 21 minutes on the date of encounter. Greater than 50% was spent in patient counseling.

## 2022-07-25 NOTE — NURSING NOTE
Reason For Visit:   Chief Complaint   Patient presents with     RECHECK     phone visit [phone], MRI results date of MRI 07/13       Primary MD: Em Kaufman  Ref. MD: Est    ?  No     Date of injury: no  Type of injury: no.     Date of surgery: no  Type of surgery: no.     Smoker: Yes  Request smoking cessation information: No    LMP 04/20/2008     Pain Assessment  Patient Currently in Pain: Yes  0-10 Pain Scale: 4  Primary Pain Location: Back    Oswestry (ANDREAS) Questionnaire    OSWESTRY DISABILITY INDEX 6/22/2022   Count 9   Sum 34   Oswestry Score (%) 75.56   Some recent data might be hidden                Monika Wise LPN

## 2022-08-29 ENCOUNTER — TELEPHONE (OUTPATIENT)
Dept: PALLIATIVE MEDICINE | Facility: CLINIC | Age: 67
End: 2022-08-29

## 2022-08-29 DIAGNOSIS — F40.240 CLAUSTROPHOBIA: ICD-10-CM

## 2022-08-29 DIAGNOSIS — F41.9 ANXIETY: Primary | ICD-10-CM

## 2022-08-29 RX ORDER — DIAZEPAM 5 MG
TABLET ORAL
Qty: 1 TABLET | Refills: 0 | Status: SHIPPED | OUTPATIENT
Start: 2022-08-29

## 2022-08-29 NOTE — TELEPHONE ENCOUNTER
RN send one time med request to [facility] to sign and authorize.    Patient has severe anxiety and is hoping to take lorazepam prior to injection. RN told patient this is not per our protocol and typically we try to accommodate with valium instead as a courtesy. Patient will accept this.    Viraj Marina RN          [facility] Call Center    Phone Message    May a detailed message be left on voicemail: yes     Reason for Call: Medication Refill Request    Has the patient contacted the pharmacy for the refill? Yes   Name of medication being requested: Lasazepam    Provider who prescribed the medication: Dr. Marco A Adair   [facility]   Date medication is needed: 08/29/2022    Pt scheduled for inj on 08/30.  Pt asking for lasazepam works better than valium.       Action Taken: Message routed to:  [facility] ([facility] Dr. Adair     Travel Screening: Not Applicable

## 2022-08-29 NOTE — TELEPHONE ENCOUNTER
Dr. Dunlap is out of the office today.   This is an injection only pt and was referred by Dr. Gilbert.  She can call her referring provider or her primary care provider.      Called pt. And relayed the above.      Betty RN-BSN  [facility]

## 2022-08-29 NOTE — TELEPHONE ENCOUNTER
Patient requesting a prescription for one Lorazepam to take before procedure on 08/30/22. [facility]. Please call patient if there is a problem with prescribing. Thank you.

## 2023-05-18 ENCOUNTER — TELEPHONE (OUTPATIENT)
Dept: VASCULAR SURGERY | Facility: CLINIC | Age: 68
End: 2023-05-18
Payer: COMMERCIAL

## 2023-05-18 NOTE — TELEPHONE ENCOUNTER
Caller: Cynthia     Provider: none    Detailed reason for call: Cynthia is calling requesting to see a wound care provider regarding a wound on right elbow (size of a silver dollar). She has has this wound since Nov, please advise whom she should see    Best phone number to contact: [phone]    Best time to contact: any    Ok to leave a detailed message: Yes    Ok to speak to authorized person if needed: No      (Noted to patient if reason is related to wound or incision, to please send a photo via email or Inxerot.)

## 2023-05-24 ENCOUNTER — OFFICE VISIT (OUTPATIENT)
Dept: VASCULAR SURGERY | Facility: CLINIC | Age: 68
End: 2023-05-24
Attending: FAMILY MEDICINE
Payer: COMMERCIAL

## 2023-05-24 VITALS
TEMPERATURE: 98.3 F | RESPIRATION RATE: 16 BRPM | SYSTOLIC BLOOD PRESSURE: 176 MMHG | DIASTOLIC BLOOD PRESSURE: 100 MMHG | HEART RATE: 88 BPM

## 2023-05-24 DIAGNOSIS — T81.89XA NON-HEALING SURGICAL WOUND, INITIAL ENCOUNTER: Primary | ICD-10-CM

## 2023-05-24 PROBLEM — F11.11 OPIOID ABUSE, IN REMISSION (H): Status: ACTIVE | Noted: 2023-05-24

## 2023-05-24 PROBLEM — Z86.73 HISTORY OF CVA (CEREBROVASCULAR ACCIDENT): Status: ACTIVE | Noted: 2017-10-20

## 2023-05-24 PROBLEM — M71.121 SEPTIC OLECRANON BURSITIS OF RIGHT ELBOW: Status: ACTIVE | Noted: 2022-12-21

## 2023-05-24 PROBLEM — I10 ESSENTIAL HYPERTENSION: Status: ACTIVE | Noted: 2018-04-12

## 2023-05-24 PROBLEM — R91.1 PULMONARY NODULE: Status: ACTIVE | Noted: 2017-06-13

## 2023-05-24 PROBLEM — S51.001S: Status: ACTIVE | Noted: 2023-01-16

## 2023-05-24 PROBLEM — M14.671 CHARCOT'S JOINT OF RIGHT FOOT: Status: ACTIVE | Noted: 2020-03-05

## 2023-05-24 PROBLEM — R73.03 PREDIABETES: Status: ACTIVE | Noted: 2020-02-20

## 2023-05-24 PROBLEM — E55.9 VITAMIN D INSUFFICIENCY: Status: ACTIVE | Noted: 2022-07-15

## 2023-05-24 PROCEDURE — 11042 DBRDMT SUBQ TIS 1ST 20SQCM/<: CPT | Performed by: FAMILY MEDICINE

## 2023-05-24 PROCEDURE — 99215 OFFICE O/P EST HI 40 MIN: CPT | Mod: 25 | Performed by: FAMILY MEDICINE

## 2023-05-24 PROCEDURE — G0463 HOSPITAL OUTPT CLINIC VISIT: HCPCS | Mod: 25 | Performed by: FAMILY MEDICINE

## 2023-05-24 RX ORDER — ASPIRIN 81 MG/1
TABLET, CHEWABLE ORAL
COMMUNITY
Start: 2023-02-27

## 2023-05-24 RX ORDER — FLUOXETINE 10 MG/1
CAPSULE ORAL
COMMUNITY
Start: 2023-04-28

## 2023-05-24 RX ORDER — HYDROXYZINE HYDROCHLORIDE 25 MG/1
TABLET, FILM COATED ORAL
COMMUNITY
Start: 2023-05-03

## 2023-05-24 RX ORDER — GLUCOSA SU 2KCL/CHONDROITIN SU 500-400 MG
1 CAPSULE ORAL DAILY
COMMUNITY

## 2023-05-24 ASSESSMENT — PAIN SCALES - GENERAL: PAINLEVEL: MODERATE PAIN (5)

## 2023-05-24 NOTE — PROGRESS NOTES
Wound Clinic Note          Visit date: 05/24/2023       Cheif Complaint:     Cynthia Chawla is a 67 year old female had concerns including Consult For (Right elbow wound).        HISTORY OF PRESENT ILLNESS:    Cynthia Chawla reports the ulcer has been present since December 2022.  The wound began after a recent surgery and the incision did not heal normally.  she initially had a septic bursitis of the right elbow in December 2022 which self expressed and was ultimately treated with antibiotics, but a wound persisted.  She then sustained a fall in January 2023 landing on the same right elbow causing the wound to open up further.  She was evaluated at a local hospital and was determined to need an operative washout of the right elbow by orthopedic surgery which was performed.  Initially after being discharged from the hospital she followed up with the orthopedic surgeon.  She then was seen at the [facility] wound clinic on March 20, 2023.  She reports she has not seen the orthopedic surgeon since February and does not have another appointment scheduled with the orthopedic surgeon.  She was accompanied to the wound clinic for initial evaluation by her PCA who provided portions of the history.    They have been bandaging the area with alginate and a dry dressing changed twice a week.  There is been light serous drainage from the wound.          The pateint denies fevers or chills.  They report the pain from the wound has been 5/10 and has remained about the same recently.      Today the patient reports maintaining a regular diet without special attention to protein.        The patient denies a history of diabetes or chronic steroid use.  and The patient confirms they do smoke cigarettes and reports smoking 4 cigarettes a day         The patient has not had any symptoms of infection relating to the wound recently and is not currently on antibiotics.       Problem List:   Past Medical History:   Diagnosis Date      Chronic pain      COPD (chronic obstructive pulmonary disease) (H)      DJD (degenerative joint disease)      Hepatitis      Major depression, recurrent (H)      Sleep apnea     have C-PAP,But don't really use it.              Family Hx: family history includes Asthma in her son; Cardiovascular in her maternal grandfather and mother.       Surgical Hx:   Past Surgical History:   Procedure Laterality Date     APPENDECTOMY       COLONOSCOPY       GYN SURGERY      tubal ligation     HEAD & NECK SURGERY      teeth ext     RECESSION RESECTION (REPAIR STRABISMUS) Right 10/22/2015    Procedure: RECESSION RESECTION (REPAIR STRABISMUS);  Surgeon: Hilario Hills MD;  Location: [facility]          Allergies:    Allergies   Allergen Reactions     Nefazodone      Nsaids      Has GFR <60     Tramadol      Seizures     Trazodone               Medication History:    Current Outpatient Medications   Medication Sig     albuterol (PROAIR HFA, PROVENTIL HFA, VENTOLIN HFA) 108 (90 BASE) MCG/ACT inhaler Inhale 2 puffs into the lungs every 4 hours as needed for shortness of breath / dyspnea or wheezing     amLODIPine (NORVASC) 5 MG tablet Take 5 mg by mouth daily Take 7.5mg daily     aspirin (ASA) 81 MG chewable tablet CHEW AND SWALLOW 1 TABLET(81 MG) BY MOUTH DAILY     atorvastatin (LIPITOR) 20 MG tablet      calcium carb 1250 mg, 500 mg [place],/vitamin D 200 units (OSCAL WITH D) 500-200 MG-UNIT per tablet Take 1 tablet by mouth 2 times daily (with meals)     cyclobenzaprine (FLEXERIL) 10 MG tablet      ferrous sulfate (FEROSUL) 325 (65 Fe) MG tablet Take 325 mg by mouth     FLUoxetine (PROZAC) 10 MG capsule      Gabapentin (NEURONTIN PO) Take 800 mg by mouth 3 times daily     hydrOXYzine (ATARAX) 25 MG tablet TAKE 1 TO 2 TABLETS(25 TO 50 MG) BY MOUTH EVERY 6 HOURS AS NEEDED FOR ANXIETY     IBUPROFEN 800 MG OR TABS 1 TABLET  DAILY AS NEEDED     melatonin 3 MG tablet Take 3 mg by mouth     Methadone HCl (METHADOSE PO) Take 100 mg by  mouth     Multiple Vitamins-Minerals (THERAPEUTIC-M) TABS Take 1 tablet by mouth daily     naloxone (NARCAN) 4 MG/0.1ML nasal spray Gently insert the tip of the nozzle into either nostril. Press the plunger firmly to give dose; remove device from nose. If no reaction in 2-3 minutes, give a second dose. Call 911 to prevent rebound overdose.     nitroGLYcerin (NITROSTAT) 0.4 MG sublingual tablet DISSOLVE 1 TABLET UNDER THE TONGUE AS NEEDED FOR CHEST PAIN     VITAMIN D, CHOLECALCIFEROL, PO Take 1,000 Units by mouth daily     Acetaminophen (TYLENOL PO) Take 500 mg by mouth every 6 hours as needed for mild pain or fever (Patient not taking: Reported on 5/24/2023)     amLODIPine (NORVASC) 5 MG tablet  (Patient not taking: Reported on 5/24/2023)     BuPROPion HCl (WELLBUTRIN XL PO) Take 150 mg by mouth daily (Patient not taking: Reported on 6/22/2022)     desonide (DESOWEN) 0.05 % ointment Apply topically 2 times daily (Patient not taking: Reported on 6/22/2022)     diazepam (VALIUM) 5 MG tablet Take one tablet 30 minutes prior lumbar injection due to severe anxiety (Patient not taking: Reported on 5/24/2023)     ipratropium - albuterol 0.5 mg/2.5 mg/3 mL (DUONEB) 0.5-2.5 (3) MG/3ML nebulization Take 1 vial by nebulization as needed for shortness of breath / dyspnea or wheezing (Patient not taking: Reported on 5/24/2023)     mometasone-formoterol (DULERA) 200-5 MCG/ACT oral inhaler Inhale 2 puffs into the lungs 2 times daily (Patient not taking: Reported on 6/22/2022)     XANAX 1 MG OR TABS 1 TABLET 2 TIMES DAILY (Patient not taking: Reported on 6/22/2022)     No current facility-administered medications for this visit.         Tobacco History:  reports that she quit smoking about 10 years ago. Her smoking use included cigarettes. She smoked an average of 1 pack per day. She has never used smokeless tobacco.       REVIEW OF SYMPTOMS:   The review of systems was negative except as noted in the HPI.           PHYSICAL  EXAMINATION:     BP (!) 176/100   Pulse 88   Temp 98.3  F (36.8  C)   Resp 16   LMP 04/20/2008            GENERAL: The patient overall appears well and is no acute distress.   HEAD: normocephalic   EYES: Sclera and conjunctiva clear   NECK: no obvious masses   LUNGS: breathing is unlabored.   EXTREMITIES: No clubbing, cyanosis or edema   SKIN: No rashes or other abnormalities except as noted under the Wound section below.   NEUROLOGICAL: normal motor and sensory function       WOUND/ulcer: The wound appears healthy with no sign of infection.   Wound bed: necrotic material  Periwound: healthy intact skin  Fairly healthy appearing surgical wound on the right elbow.  There is no exposed bone or joint capsule visible at the base of the wound.      Also see below for wound details:     Circumferential volume measures:             View : No data to display.                Ulceration(s)/Wound(s):   Please see the media tab under the chart review for pictures of the wounds.  Nursing staff removed dressings and cleansed wound.    VASC Wound right elbow (Active)   Pre Size Length 2.5 05/24/23 0900   Pre Size Width 2 05/24/23 0900   Pre Size Depth 1 05/24/23 0900   Pre Total Sq cm 5 05/24/23 0900           No results for input(s): HGBA1C, A1C, EAG in the last 92701 hours.    Invalid input(s): [id number]       Recent Labs   Lab Test 07/02/21  0510   ALBUMIN 3.5              Procedure note: , Informed Consent:  Patient acknowledges that I have explained the patient's general medical condition to him/her.  Patient has been informed and acknowledges that I have explained the risks or complications of wound debridement including, but not limited to, scarring, damage to blood vessels or surrounding areas such as nerves and organs, allergic reactions to topical and injected anaesthetic and/or skin prep solutions.  Other risks include excessive bleeding, removal of healthy tissue, infection, pain and inflammation, and prolonged  or failure to heal.  Patient acknowledges that bleeding after debridement and pain may worsen after debridement and that dead/necrotic tissue may cause bacteria and toxins to be released into the bloodstream and cause sepsis or shock.  Patient acknowledges that I have explained that the wound may be larger after debridement.  Patient acknowledges that they may need serial debridements while under care in the wound department.  Patient acknowledges that they were given an opportunity to ask questions about treatment and I have answered patient's questions.   , Anesthetized as needed with lidocaine. , Using a curette and/or a scalpel I performed an excisional debridement removing all necrotic material at the Right elbow wound down to the level of viable subcutaneous tissue.  I obtained hemostasis with direct pressure.  The patient tolerated the procedure well. , EBL: <5 ml  and Total debridement surface area: Less than 20 cm                  ASSESSMENT:   This is a 67 year old female with a right elbow surgical wound          PLAN:   We will bandage the area with endoform and a Mepilex bandage changed every other day.  I have encouraged her to be vigilant to make sure there is no pressure or friction applied to this area throughout the day.  I also explained that one of the most important things we can do to help wounds over joints heal is to minimize the movement of the joint.  I recommended she begin using her sling which she had previously received at the time of her surgery.  She should use the sling every day to minimize the movement of the right elbow joint.  I have explained to the patient the importance of protein intake to wound healing.  I have explained that increasing protein intake will speed wound healing.  We discussed several types of food that are high in protein and the wound care nurse gave the patient a handout that summarizes this information.  In addition to further speed wound healing I have  encouraged the patient to take a protein supplement.  and I have explained to the patient that cigarette smoking will slow wound healing and I have strongly encouraged the patient to reduce their cigarette smoking and ultimately to quit.  I specially recommended that they lay out the cigarettes they expect to smoke in a day and every 3-4 days they should remove one cigarette.  This way they slowly decrease their cigarette smoking.  I spent 10 minutes discussing smoking cessation with the patient.  The patient will return to the wound clinic in one week to see me again.        45 minutes spent on the date of the encounter doing chart review, history and exam, documentation and further activities per the note      Kojo Osborn MD  05/24/2023   10:16 AM   [facility]  [phone]    This note was electronically signed by Kojo Osborn MD

## 2023-05-24 NOTE — PATIENT INSTRUCTIONS
Wound care supplies were ordered today through [employer] and if you are not receiving your supplies or have a question on your bill please contact Nata Squires at [phone]. Please allow 2-5 business days for delivery of supplies. You may get a call from a [phone] # if there are additional information [employer] needs. It is important to  or return their call. PLEASE NOTE: If you need to return your supplies, you MUST call customer service within 15 days of delivery date.         Wound Care Instructions    1. Every other day cleanse your wound with saline, pat dry. DO NOT REMOVE OLD ENDOFORM- LEAVE ON THE WOUND    2.  Cut Endoform to the size of the wound. It is okay if it overlap the edges a small amount.  Then, moisten the Endoform with a drop or two saline.    3. Apply Endoform to wound (over the old Endoform) then cover with mepilex/silicone foam       *Endoform is naturally used by the body over time so you may not find it in the wound when you change your dressing.  If you do find some, gently cleanse the wound with saline. Do not remove the remaining Endoform*      It is ok to get your wound wet in the bath or shower    High Protein Foods  When you have an open ulcer, your bodies protein needs are much higher, so it is recommended eat good sources of protein or take a protein supplement!    Protein Supplements  -Premier Protein  -Ensure  -Boost  -Glucerna, if diabetic    Chicken  -Chicken breast, 3.5oz.-30 grams protein  -Chicken meat, cooked, 4 oz.    Beef  -Hamburger nba, 4 oz-28 grams protein  -Steak, 6 oz-42 grams  -Most cuts of beef- 7 grams of protein per ounce    Fish  -Most fish fillets or steaks are about 22 grams of protein for 3 1/2 oz(100 grams) of cooked fish, or 6 grams per ounce  -Tuna, 6 oz can-40 grams of protein    Pork  -Pork chop, average-22 grams protein  -Pork loin or tenderloin, 4 oz.-29 grams  -Ham, 3oz serving- 19 grams  -Mcneill, 1 slice-3 grams    Eggs and Dairy  -Egg, large-7  grams  -Milk, 1 cup-8 grams  -Cottage cheese, 1/2 cup-15 grams  -Greek yogurt, 1 cup-usually 8-12 grams, check label    Beans  -Soy milk, 1 cup-6-10 grams  -Most beans(black, whitlock, lentils, etc.) about 7-10 grams protein per half cup of cooked beans    Nuts and Seeds  -Peanut butter, 2 Tablespoons- 8 grams protein  -Almonds, 1/4 cup- 8 grams  -Peanuts, 1/4 cup-9 grams  -Sunflower seeds, 1/4 cup- 6 grams        If for some reason you are not able to get your dressing(s) changed as outlined above (due to illness, lack of supplies, lack of help) please do the following: remove old, soiled dressings; wash the wounds with saline; pat dry; apply ABD pad or other absorbant pad and secure with rolled gauze; avoid tape directly on your skin; Call the clinic as soon as possible to let us know what the current issues are in receiving wound care [phone].      SEEK MEDICAL CARE IF:  You have an increase in swelling, pain, or redness around the wound.  You have an increase in the amount of pus coming from the wound.  There is a bad smell coming from the wound.  The wound appears to be worsening/enlarging  You have a fever greater than 101.5 F      It is ok to continue current wound care treatment/products for the next 2-3 days until new wound care supplies are ordered and arrive. If longer than this please contact our office at [phone].    If you have a 2 layer or 4 layer compression wrap on these are safe to have on for ONLY 7 days. If for some reason you are not able to get the wrap(s) changed (due to illness; lack of supplies, lack of help, lack of transportation) please do the following: unwrap the old 2 or 4 layer compression wrap; avoid using scissors as you could cut your skin and cause wounds; use tubular compression when available. Call to reschedule your home care or clinic visit appointment as soon as possible.    Please NOTE: if you are 15 minutes late to your clinic appointment you will have to be  rescheduled. Please call our clinic as soon as possible if you know you will not be able to get to your appointment at [phone].    If you fail to show up to 3 scheduled clinic appointments you will be dismissed from our clinic.              We want to hear from you!  In the next few weeks, you should receive a call or email to complete a survey about your visit at [facility]. Please help us improve your appointment experience by letting us know how we did today. We strive to make your experience good and value any ways in which we could do better.      We value your input and suggestions.    Thank you for choosing the [facility]!

## 2023-05-24 NOTE — LETTER
"[facility]  [address] [place] [address] [address], [place] [address]  [phone]      Fax [phone]    [facility]           Fax: [phone]            Customer Service: [phone]        Account #: [id number]    Wound Dressing Rx and Order Form  Order Status: new  Verbal: Anay  Date: May 24, 2023     Cynthia Chawla  Gender: female  : 1955  [address]    [facility] [address]  [phone] (home)     Medical Record: [id number]  Primary Care Provider: Em Kaufman      ICD-10-CM    1. Non-healing surgical wound, initial encounter  T81.89XA Wound care            Insurance Info:  INSURER: Payor: MEDICA / Plan: MEDICA [employer] / Product Type: Indemnity /   Policy ID#:  [id number]  SECONDARY INSURANCE:    Secondary Policy ID#:  N/A        Physician Info:   Name:  MIKE DALEY     Dept Address/Phones:   [address] [place] [address], [address] [id number] [phone]  Fax: [phone]    Lymphedema circumferential measurements (in cm):       View : No data to display.                  Wound info:  VASC Wound right elbow (Active)   Number of days: 0       VASC Wound right elbow (Active)   Pre Size Length 2.5 23 0900   Pre Size Width 2 23 0900   Pre Size Depth 1 23 0900   Pre Total Sq cm 5 23 0900   Number of days: 0        Drainage: moderate  Thickness:  full  Duration of Need: 30 DAYS  Days Supply: 30 DAYS  Start Date: 2023  Starter Kit: Ancillary Kit (saline, gloves, gauze)  Qualifying wound/Debridement: Yes      Dressing Type Brand Size Frequency of change -or- Quantity   Primary endoform  2\"x2\" EVERY OTHER DAY and as needed    Mepilex border adhesive bandage  3\"x3\" EVERY OTHER DAY and as needed     No substitutions preferred. Call [phone].         OK to forward to covered supplier.    Electronically Signed Physician:  MIKE DALEY             Date: May 24, 2023    "

## 2023-05-30 ENCOUNTER — TELEPHONE (OUTPATIENT)
Dept: VASCULAR SURGERY | Facility: CLINIC | Age: 68
End: 2023-05-30
Payer: COMMERCIAL

## 2023-05-30 NOTE — TELEPHONE ENCOUNTER
Caller: Patient    Provider: MD Kojo Osborn    Detailed reason for call: Patient states she has not received any supplies since her appointment with Dr. Osborn. She is scheduled to be seen tomorrow.     Best phone number to contact: [phone]    Best time to contact: Any    Ok to leave a detailed message: Yes    Ok to speak to authorized person if needed: No      (Noted to patient if reason is related to wound or incision, to please send a photo via email or Tyklit.)

## 2023-05-31 ENCOUNTER — OFFICE VISIT (OUTPATIENT)
Dept: VASCULAR SURGERY | Facility: CLINIC | Age: 68
End: 2023-05-31
Attending: FAMILY MEDICINE
Payer: COMMERCIAL

## 2023-05-31 VITALS
TEMPERATURE: 97.6 F | SYSTOLIC BLOOD PRESSURE: 146 MMHG | HEART RATE: 89 BPM | OXYGEN SATURATION: 96 % | RESPIRATION RATE: 20 BRPM | DIASTOLIC BLOOD PRESSURE: 84 MMHG

## 2023-05-31 DIAGNOSIS — T81.89XD NON-HEALING SURGICAL WOUND, SUBSEQUENT ENCOUNTER: Primary | ICD-10-CM

## 2023-05-31 PROCEDURE — 11042 DBRDMT SUBQ TIS 1ST 20SQCM/<: CPT | Performed by: FAMILY MEDICINE

## 2023-05-31 PROCEDURE — G0463 HOSPITAL OUTPT CLINIC VISIT: HCPCS | Mod: 25 | Performed by: FAMILY MEDICINE

## 2023-05-31 ASSESSMENT — PAIN SCALES - GENERAL: PAINLEVEL: NO PAIN (1)

## 2023-05-31 NOTE — PROGRESS NOTES
Wound Clinic Note          Visit date: 05/31/2023       Cheif Complaint:     Cynthia Chawla is a 67 year old female had concerns including right elbow wound .  She has a right elbow wound.      HISTORY OF PRESENT ILLNESS:    Cynthia Chawla reports the ulcer has been present since December 2022.  The wound began after a recent surgery and the incision did not heal normally.  She initially had a septic bursitis of the right elbow in December 2022 which self expressed and was ultimately treated with antibiotics, but a wound persisted.  She then sustained a fall in January 2023 landing on the same right elbow causing the wound to open up further.  She was evaluated at a local hospital and was determined to need an operative washout of the right elbow by orthopedic surgery which was performed.  Initially after being discharged from the hospital she followed up with the orthopedic surgeon.  She then was seen at the [facility] wound clinic on March 20, 2023.  She reports she has not seen the orthopedic surgeon since February and does not have another appointment scheduled with the orthopedic surgeon.  She was accompanied to the wound clinic for initial evaluation by her PCA who provided portions of the history.    Today she is again accompanied to the wound clinic by her PCA and they both provide portions of the interval history.    The patient reports that they just received the shipment of the bandages yesterday so she had run out of the endoform since her last clinic visit with me.  For the most part she is just been bandaging the wound with a Mepilex bandage changed every other day.  There is been light serous drainage from the wound.  There is been no difficulties with the dressing changes.    She reports she forgot to get the sling that I had recommended but we will get that within the next day or 2.      The pateint denies fevers or chills.  They report the pain from the wound has been 0/10 and has remained about  the same recently.      Today the patient reports maintaining a high protein diet, but has not been taking protein supplements lately.        The patient denies a history of diabetes or chronic steroid use.  She reports today that since her last clinic visit with me she has quit smoking but is still using nicotine patches to help her decrease her cravings.    The patient has not had any symptoms of infection relating to the wound recently and is not currently on antibiotics.       Problem List:   Past Medical History:   Diagnosis Date     Chronic pain      COPD (chronic obstructive pulmonary disease) (H)      DJD (degenerative joint disease)      Hepatitis      Major depression, recurrent (H)      Sleep apnea     have C-PAP,But don't really use it.              Family Hx: family history includes Asthma in her son; Cardiovascular in her maternal grandfather and mother.       Surgical Hx:   Past Surgical History:   Procedure Laterality Date     APPENDECTOMY       COLONOSCOPY       GYN SURGERY      tubal ligation     HEAD & NECK SURGERY      teeth ext     RECESSION RESECTION (REPAIR STRABISMUS) Right 10/22/2015    Procedure: RECESSION RESECTION (REPAIR STRABISMUS);  Surgeon: Hilario Hills MD;  Location: [facility]          Allergies:    Allergies   Allergen Reactions     Nefazodone      Nsaids      Has GFR <60     Tramadol      Seizures     Trazodone               Medication History:    Current Outpatient Medications   Medication Sig     albuterol (PROAIR HFA, PROVENTIL HFA, VENTOLIN HFA) 108 (90 BASE) MCG/ACT inhaler Inhale 2 puffs into the lungs every 4 hours as needed for shortness of breath / dyspnea or wheezing     amLODIPine (NORVASC) 5 MG tablet Take 5 mg by mouth daily Take 7.5mg daily     aspirin (ASA) 81 MG chewable tablet CHEW AND SWALLOW 1 TABLET(81 MG) BY MOUTH DAILY     atorvastatin (LIPITOR) 20 MG tablet      calcium carb 1250 mg, 500 mg [place],/vitamin D 200 units (OSCAL WITH D) 500-200 MG-UNIT per  tablet Take 1 tablet by mouth 2 times daily (with meals)     cyclobenzaprine (FLEXERIL) 10 MG tablet      ferrous sulfate (FEROSUL) 325 (65 Fe) MG tablet Take 325 mg by mouth     FLUoxetine (PROZAC) 10 MG capsule      Gabapentin (NEURONTIN PO) Take 800 mg by mouth 3 times daily     hydrOXYzine (ATARAX) 25 MG tablet TAKE 1 TO 2 TABLETS(25 TO 50 MG) BY MOUTH EVERY 6 HOURS AS NEEDED FOR ANXIETY     IBUPROFEN 800 MG OR TABS 1 TABLET  DAILY AS NEEDED     melatonin 3 MG tablet Take 3 mg by mouth     Methadone HCl (METHADOSE PO) Take 100 mg by mouth     Multiple Vitamins-Minerals (THERAPEUTIC-M) TABS Take 1 tablet by mouth daily     naloxone (NARCAN) 4 MG/0.1ML nasal spray Gently insert the tip of the nozzle into either nostril. Press the plunger firmly to give dose; remove device from nose. If no reaction in 2-3 minutes, give a second dose. Call 911 to prevent rebound overdose.     nitroGLYcerin (NITROSTAT) 0.4 MG sublingual tablet DISSOLVE 1 TABLET UNDER THE TONGUE AS NEEDED FOR CHEST PAIN     VITAMIN D, CHOLECALCIFEROL, PO Take 1,000 Units by mouth daily     Acetaminophen (TYLENOL PO) Take 500 mg by mouth every 6 hours as needed for mild pain or fever (Patient not taking: Reported on 5/24/2023)     amLODIPine (NORVASC) 5 MG tablet  (Patient not taking: Reported on 5/24/2023)     BuPROPion HCl (WELLBUTRIN XL PO) Take 150 mg by mouth daily (Patient not taking: Reported on 6/22/2022)     desonide (DESOWEN) 0.05 % ointment Apply topically 2 times daily (Patient not taking: Reported on 6/22/2022)     diazepam (VALIUM) 5 MG tablet Take one tablet 30 minutes prior lumbar injection due to severe anxiety (Patient not taking: Reported on 5/24/2023)     ipratropium - albuterol 0.5 mg/2.5 mg/3 mL (DUONEB) 0.5-2.5 (3) MG/3ML nebulization Take 1 vial by nebulization as needed for shortness of breath / dyspnea or wheezing (Patient not taking: Reported on 5/24/2023)     mometasone-formoterol (DULERA) 200-5 MCG/ACT oral inhaler Inhale 2  puffs into the lungs 2 times daily (Patient not taking: Reported on 6/22/2022)     XANAX 1 MG OR TABS 1 TABLET 2 TIMES DAILY (Patient not taking: Reported on 6/22/2022)     No current facility-administered medications for this visit.         Tobacco History:  reports that she quit smoking about 10 years ago. Her smoking use included cigarettes. She smoked an average of 1 pack per day. She has never used smokeless tobacco.       REVIEW OF SYMPTOMS:   The review of systems was negative except as noted in the HPI.           PHYSICAL EXAMINATION:     BP (!) 146/84   Pulse 89   Temp 97.6  F (36.4  C)   Resp 20   LMP 04/20/2008   SpO2 96%            GENERAL: The patient overall appears well and is no acute distress.   HEAD: normocephalic   EYES: Sclera and conjunctiva clear   NECK: no obvious masses   LUNGS: breathing is unlabored.   EXTREMITIES: No clubbing, cyanosis or edema   SKIN: No rashes or other abnormalities except as noted under the Wound section below.   NEUROLOGICAL: normal motor and sensory function       WOUND/ulcer: The wound appears healthy with no sign of infection.   Wound bed: necrotic material  Periwound: healthy intact skin  Fairly healthy appearing surgical wound on the right elbow.  There is no exposed bone or joint capsule visible at the base of the wound.  Overall the wound still appears about the same compared with her last clinic visit.      Also see below for wound details:     Circumferential volume measures:             View : No data to display.                Ulceration(s)/Wound(s):   Please see the media tab under the chart review for pictures of the wounds.  Nursing staff removed dressings and cleansed wound.    VASC Wound right elbow (Active)   Pre Size Length 2.2 05/31/23 0800   Pre Size Width 2 05/31/23 0800   Pre Size Depth 0.8 05/31/23 0800           No results for input(s): HGBA1C, A1C, EAG in the last 35554 hours.    Invalid input(s): [id number]       Recent Labs   Lab Test  07/02/21  0510   ALBUMIN 3.5              Procedure note: , I had previous obtain informed consent from the patient to perform serial debridements, I confirmed this again with the patient today verbally. , Anesthetized as needed with lidocaine. , Using a curette and/or a scalpel I performed an excisional debridement removing all necrotic material at the Right elbow wound down to the level of viable subcutaneous tissue.  I obtained hemostasis with direct pressure.  The patient tolerated the procedure well. , EBL: <5 ml  and Total debridement surface area: Less than 20 cm                  ASSESSMENT:   This is a 67 year old female with a right elbow surgical wound          PLAN:   We will bandage the area with endoform and a Mepilex bandage changed every other day.  I have encouraged her to be vigilant to make sure there is no pressure or friction applied to this area throughout the day.  I strongly encouraged her to get a sling and use that every day.    I have encouraged the patient to continue on their high protein diet to aid in wound healing.    I have also encouraged her to continue not to smoke cigarettes.  The patient will return to the wound clinic in one week to see me again.        30 minutes spent on the date of the encounter doing chart review, history and exam, documentation and further activities per the note      Kojo Osborn MD  05/31/2023   9:22 AM   [facility]  [phone]    This note was electronically signed by Kojo Osborn MD

## 2023-05-31 NOTE — PATIENT INSTRUCTIONS
Wound care supplies were not ordered or needed today.        Wound Care Instructions    1. Every other day cleanse your wound with saline, pat dry. DO NOT REMOVE OLD ENDOFORM- LEAVE ON THE WOUND    2.  Cut Endoform to the size of the wound. It is okay if it overlap the edges a small amount.  Then, moisten the Endoform with a drop or two saline.    3. Apply Endoform to wound (over the old Endoform) then cover with mepilex/silicone foam       *Endoform is naturally used by the body over time so you may not find it in the wound when you change your dressing.  If you do find some, gently cleanse the wound with saline. Do not remove the remaining Endoform*      It is ok to get your wound wet in the bath or shower    High Protein Foods  When you have an open ulcer, your bodies protein needs are much higher, so it is recommended eat good sources of protein or take a protein supplement!    Protein Supplements  -Premier Protein  -Ensure  -Boost  -Glucerna, if diabetic    Chicken  -Chicken breast, 3.5oz.-30 grams protein  -Chicken meat, cooked, 4 oz.    Beef  -Hamburger nba, 4 oz-28 grams protein  -Steak, 6 oz-42 grams  -Most cuts of beef- 7 grams of protein per ounce    Fish  -Most fish fillets or steaks are about 22 grams of protein for 3 1/2 oz(100 grams) of cooked fish, or 6 grams per ounce  -Tuna, 6 oz can-40 grams of protein    Pork  -Pork chop, average-22 grams protein  -Pork loin or tenderloin, 4 oz.-29 grams  -Ham, 3oz serving- 19 grams  -Mcneill, 1 slice-3 grams    Eggs and Dairy  -Egg, large-7 grams  -Milk, 1 cup-8 grams  -Cottage cheese, 1/2 cup-15 grams  -Greek yogurt, 1 cup-usually 8-12 grams, check label    Beans  -Soy milk, 1 cup-6-10 grams  -Most beans(black, whitlock, lentils, etc.) about 7-10 grams protein per half cup of cooked beans    Nuts and Seeds  -Peanut butter, 2 Tablespoons- 8 grams protein  -Almonds, 1/4 cup- 8 grams  -Peanuts, 1/4 cup-9 grams  -Sunflower seeds, 1/4 cup- 6 grams        If for some  reason you are not able to get your dressing(s) changed as outlined above (due to illness, lack of supplies, lack of help) please do the following: remove old, soiled dressings; wash the wounds with saline; pat dry; apply ABD pad or other absorbant pad and secure with rolled gauze; avoid tape directly on your skin; Call the clinic as soon as possible to let us know what the current issues are in receiving wound care [phone].      SEEK MEDICAL CARE IF:  You have an increase in swelling, pain, or redness around the wound.  You have an increase in the amount of pus coming from the wound.  There is a bad smell coming from the wound.  The wound appears to be worsening/enlarging  You have a fever greater than 101.5 F      It is ok to continue current wound care treatment/products for the next 2-3 days until new wound care supplies are ordered and arrive. If longer than this please contact our office at [phone].    If you have a 2 layer or 4 layer compression wrap on these are safe to have on for ONLY 7 days. If for some reason you are not able to get the wrap(s) changed (due to illness; lack of supplies, lack of help, lack of transportation) please do the following: unwrap the old 2 or 4 layer compression wrap; avoid using scissors as you could cut your skin and cause wounds; use tubular compression when available. Call to reschedule your home care or clinic visit appointment as soon as possible.    Please NOTE: if you are 15 minutes late to your clinic appointment you will have to be rescheduled. Please call our clinic as soon as possible if you know you will not be able to get to your appointment at [phone].    If you fail to show up to 3 scheduled clinic appointments you will be dismissed from our clinic.              We want to hear from you!  In the next few weeks, you should receive a call or email to complete a survey about your visit at [facility]. Please help us improve your  appointment experience by letting us know how we did today. We strive to make your experience good and value any ways in which we could do better.      We value your input and suggestions.    Thank you for choosing the [facility]!

## 2023-06-07 ENCOUNTER — OFFICE VISIT (OUTPATIENT)
Dept: VASCULAR SURGERY | Facility: CLINIC | Age: 68
End: 2023-06-07
Attending: FAMILY MEDICINE
Payer: COMMERCIAL

## 2023-06-07 VITALS — SYSTOLIC BLOOD PRESSURE: 98 MMHG | DIASTOLIC BLOOD PRESSURE: 62 MMHG | HEART RATE: 80 BPM | TEMPERATURE: 98.1 F

## 2023-06-07 DIAGNOSIS — T81.89XD NON-HEALING SURGICAL WOUND, SUBSEQUENT ENCOUNTER: Primary | ICD-10-CM

## 2023-06-07 PROCEDURE — G0463 HOSPITAL OUTPT CLINIC VISIT: HCPCS | Mod: 25 | Performed by: FAMILY MEDICINE

## 2023-06-07 PROCEDURE — 11042 DBRDMT SUBQ TIS 1ST 20SQCM/<: CPT | Performed by: FAMILY MEDICINE

## 2023-06-07 RX ORDER — AMOXICILLIN AND CLAVULANATE POTASSIUM 500; 125 MG/1; MG/1
1 TABLET, FILM COATED ORAL 2 TIMES DAILY
Qty: 28 TABLET | Refills: 0 | Status: SHIPPED | OUTPATIENT
Start: 2023-06-07 | End: 2023-06-21

## 2023-06-07 ASSESSMENT — PAIN SCALES - GENERAL: PAINLEVEL: MODERATE PAIN (5)

## 2023-06-07 NOTE — PROGRESS NOTES
Wound Clinic Note          Visit date: 06/07/2023       Cheif Complaint:     Cynthia Chawla is a 67 year old female had concerns including Wound Check.  She has a right elbow wound.      HISTORY OF PRESENT ILLNESS:    Cynthia Chawla reports the ulcer has been present since December 2022.  The wound began after a recent surgery and the incision did not heal normally.  She initially had a septic bursitis of the right elbow in December 2022 which self expressed and was ultimately treated with antibiotics, but a wound persisted.  She then sustained a fall in January 2023 landing on the same right elbow causing the wound to open up further.  She was evaluated at a local hospital and was determined to need an operative washout of the right elbow by orthopedic surgery which was performed.  Initially after being discharged from the hospital she followed up with the orthopedic surgeon.  She then was [place] [facility] [place], 2023.  She reports she has not seen the orthopedic surgeon since February and does not have another appointment scheduled with the orthopedic surgeon.  She was accompanied to the wound clinic for initial evaluation by her PCA who provided portions of the history.    Today she is again accompanied to the wound clinic by her PCA and they both provide portions of the interval history.    Since her last clinic visit her PCA has been changing the bandages every other day using endoform and a Mepilex bandage.  They both note that the drainage from the wound has increased recently, also they note that there is some redness around the wound and the wound has been more painful.    She has been using the sling since her last clinic visit with me.      The pateint denies fevers or chills.  They report the pain from the wound has been 4/10 and has increased recently.      Today the patient reports maintaining a high protein diet, but has not been taking protein supplements lately.        The  patient denies a history of diabetes or chronic steroid use.  She is still not smoking cigarettes but she is still vaping and has stopped using the nicotine patches now.    The patient has not had any symptoms of infection relating to the wound recently and is not currently on antibiotics.       Problem List:   Past Medical History:   Diagnosis Date     Chronic pain      COPD (chronic obstructive pulmonary disease) (H)      DJD (degenerative joint disease)      Hepatitis      Major depression, recurrent (H)      Sleep apnea     have C-PAP,But don't really use it.              Family Hx: family history includes Asthma in her son; Cardiovascular in her maternal grandfather and mother.       Surgical Hx:   Past Surgical History:   Procedure Laterality Date     APPENDECTOMY       COLONOSCOPY       GYN SURGERY      tubal ligation     HEAD & NECK SURGERY      teeth ext     RECESSION RESECTION (REPAIR STRABISMUS) Right 10/22/2015    Procedure: RECESSION RESECTION (REPAIR STRABISMUS);  Surgeon: Hilario Hills MD;  Location: [facility]          Allergies:    Allergies   Allergen Reactions     Nefazodone      Nsaids      Has GFR <60     Tramadol      Seizures     Trazodone               Medication History:    Current Outpatient Medications   Medication Sig     Acetaminophen (TYLENOL PO) Take 500 mg by mouth every 6 hours as needed for mild pain or fever     albuterol (PROAIR HFA, PROVENTIL HFA, VENTOLIN HFA) 108 (90 BASE) MCG/ACT inhaler Inhale 2 puffs into the lungs every 4 hours as needed for shortness of breath / dyspnea or wheezing     amLODIPine (NORVASC) 5 MG tablet Take 5 mg by mouth daily Take 7.5mg daily     amLODIPine (NORVASC) 5 MG tablet      amoxicillin-clavulanate (AUGMENTIN) 500-125 MG tablet Take 1 tablet by mouth 2 times daily for 14 days     aspirin (ASA) 81 MG chewable tablet CHEW AND SWALLOW 1 TABLET(81 MG) BY MOUTH DAILY     atorvastatin (LIPITOR) 20 MG tablet      BuPROPion HCl (WELLBUTRIN XL PO)  Take 150 mg by mouth daily     calcium carb 1250 mg, 500 mg [place],/vitamin D 200 units (OSCAL WITH D) 500-200 MG-UNIT per tablet Take 1 tablet by mouth 2 times daily (with meals)     cyclobenzaprine (FLEXERIL) 10 MG tablet      desonide (DESOWEN) 0.05 % ointment Apply topically 2 times daily     diazepam (VALIUM) 5 MG tablet Take one tablet 30 minutes prior lumbar injection due to severe anxiety     ferrous sulfate (FEROSUL) 325 (65 Fe) MG tablet Take 325 mg by mouth     FLUoxetine (PROZAC) 10 MG capsule      Gabapentin (NEURONTIN PO) Take 800 mg by mouth 3 times daily     hydrOXYzine (ATARAX) 25 MG tablet TAKE 1 TO 2 TABLETS(25 TO 50 MG) BY MOUTH EVERY 6 HOURS AS NEEDED FOR ANXIETY     IBUPROFEN 800 MG OR TABS 1 TABLET  DAILY AS NEEDED     ipratropium - albuterol 0.5 mg/2.5 mg/3 mL (DUONEB) 0.5-2.5 (3) MG/3ML nebulization Take 1 vial by nebulization as needed for shortness of breath or wheezing     melatonin 3 MG tablet Take 3 mg by mouth     Methadone HCl (METHADOSE PO) Take 100 mg by mouth     mometasone-formoterol (DULERA) 200-5 MCG/ACT oral inhaler Inhale 2 puffs into the lungs 2 times daily     Multiple Vitamins-Minerals (THERAPEUTIC-M) TABS Take 1 tablet by mouth daily     naloxone (NARCAN) 4 MG/0.1ML nasal spray Gently insert the tip of the nozzle into either nostril. Press the plunger firmly to give dose; remove device from nose. If no reaction in 2-3 minutes, give a second dose. Call 911 to prevent rebound overdose.     nitroGLYcerin (NITROSTAT) 0.4 MG sublingual tablet DISSOLVE 1 TABLET UNDER THE TONGUE AS NEEDED FOR CHEST PAIN     VITAMIN D, CHOLECALCIFEROL, PO Take 1,000 Units by mouth daily     XANAX 1 MG OR TABS      No current facility-administered medications for this visit.         Tobacco History:  reports that she quit smoking about 10 years ago. Her smoking use included cigarettes. She smoked an average of 1 pack per day. She has never used smokeless tobacco.       REVIEW OF SYMPTOMS:   The  review of systems was negative except as noted in the HPI.           PHYSICAL EXAMINATION:     BP 98/62   Pulse 80   Temp 98.1  F (36.7  C)   LMP 04/20/2008            GENERAL: The patient overall appears well and is no acute distress.   HEAD: normocephalic   EYES: Sclera and conjunctiva clear   NECK: no obvious masses   LUNGS: breathing is unlabored.   EXTREMITIES: No clubbing, cyanosis or edema   SKIN: No rashes or other abnormalities except as noted under the Wound section below.   NEUROLOGICAL: normal motor and sensory function       WOUND/ulcer: There is a periwound erythema present today with increased warmth of the skin in the area.  There is no subcutaneous emphysema or woody induration.     Wound bed: necrotic material  Periwound: cellulitic without crepitus or woody induration   Today there is a fairly mild periwound erythema.  The wound measurements are not improved at all yet.      Also see below for wound details:     Circumferential volume measures:             View : No data to display.                Ulceration(s)/Wound(s):   Please see the media tab under the chart review for pictures of the wounds.  Nursing staff removed dressings and cleansed wound.    VASC Wound right elbow (Active)   Pre Size Length 2.5 06/07/23 0900   Pre Size Width 2.5 06/07/23 0900   Pre Size Depth 0.5 06/07/23 0900   Pre Total Sq cm 6.25 06/07/23 0900   Undermined 2cm @ 10:00-2:00 06/07/23 0900           No results for input(s): HGBA1C, A1C, EAG in the last 07899 hours.    Invalid input(s): [id number]       Recent Labs   Lab Test 07/02/21  0510   ALBUMIN 3.5              Procedure note: , I had previous obtain informed consent from the patient to perform serial debridements, I confirmed this again with the patient today verbally. , Anesthetized as needed with lidocaine. , Using a curette and/or a scalpel I performed an excisional debridement removing all necrotic material at the Right elbow wound down to the level of  viable subcutaneous tissue.  I obtained hemostasis with direct pressure.  The patient tolerated the procedure well. , EBL: <5 ml  and Total debridement surface area: Less than 20 cm                  ASSESSMENT:   This is a 67 year old female with a right elbow surgical wound          PLAN:   We will begin performing a 10-minute Vashe soak followed by alginate and a Mepilex bandage changed every other day.  I have prescribed her Augmentin for her early wound infection which she will take for 14 days.  I advised her to call us or come to the emergency department if she develops symptoms of allergic reaction or worsening infection.  I have encouraged her to continue to use the sling to minimize movement of the elbow joint.    I have encouraged her to be vigilant to make sure there is no pressure or friction applied to this area throughout the day.    I have encouraged the patient to continue on their high protein diet to aid in wound healing.    I have also encouraged her to continue not to smoke cigarettes.  The patient will return to the wound clinic in 1 to 2 weeks to see me again.        30 minutes spent on the date of the encounter doing chart review, history and exam, documentation and further activities per the note      Kojo Osborn MD  06/07/2023   9:34 AM   [facility] Vascular/Wound  [phone]    This note was electronically signed by Kojo Osborn MD

## 2023-06-07 NOTE — PATIENT INSTRUCTIONS
Wound care supplies were not ordered or needed today.        Wound Care Instructions    EVERY OTHER DAY and as needed, Cleanse your right elbow wound(s) with vashe- do a 10 minute soak using gauze    Pat Dry with non-sterile gauze    Apply Lotion to the intact skin surrounding your wound and other dry skin locations. Some good lotions include: Remedy Skin Repair Cream, Sarna, Vanicream or Cetaphil    Primary Dressing: Apply aquacel AG into/onto the wounds    Secondary dressing: Cover with mepilex    It is ok to get your wound wet in the bath or shower      If for some reason you are not able to get your dressing(s) changed as outlined above (due to illness, lack of supplies, lack of help) please do the following: remove old, soiled dressings; wash the wounds with saline; pat dry; apply ABD pad or other absorbant pad and secure with rolled gauze; avoid tape directly on your skin; Call the clinic as soon as possible to let us know what the current issues are in receiving wound care [phone].      SEEK MEDICAL CARE IF:  You have an increase in swelling, pain, or redness around the wound.  You have an increase in the amount of pus coming from the wound.  There is a bad smell coming from the wound.  The wound appears to be worsening/enlarging  You have a fever greater than 101.5 F      It is ok to continue current wound care treatment/products for the next 2-3 days until new wound care supplies are ordered and arrive. If longer than this please contact our office at [phone].    If you have a 2 layer or 4 layer compression wrap on these are safe to have on for ONLY 7 days. If for some reason you are not able to get the wrap(s) changed (due to illness; lack of supplies, lack of help, lack of transportation) please do the following: unwrap the old 2 or 4 layer compression wrap; avoid using scissors as you could cut your skin and cause wounds; use tubular compression when available. Call to reschedule your home care  or clinic visit appointment as soon as possible.    Please NOTE: if you are 15 minutes late to your clinic appointment you will have to be rescheduled. Please call our clinic as soon as possible if you know you will not be able to get to your appointment at [phone].    If you fail to show up to 3 scheduled clinic appointments you will be dismissed from our clinic.              We want to hear from you!  In the next few weeks, you should receive a call or email to complete a survey about your visit at [facility]. Please help us improve your appointment experience by letting us know how we did today. We strive to make your experience good and value any ways in which we could do better.      We value your input and suggestions.    Thank you for choosing the [facility]!

## 2023-06-28 RX ORDER — IBUPROFEN 600 MG/1
TABLET, FILM COATED ORAL
COMMUNITY
Start: 2023-06-19

## 2023-06-28 RX ORDER — GABAPENTIN 800 MG/1
TABLET ORAL
COMMUNITY
Start: 2023-06-16

## 2023-06-29 ENCOUNTER — OFFICE VISIT (OUTPATIENT)
Dept: VASCULAR SURGERY | Facility: CLINIC | Age: 68
End: 2023-06-29
Attending: FAMILY MEDICINE
Payer: COMMERCIAL

## 2023-06-29 VITALS
DIASTOLIC BLOOD PRESSURE: 75 MMHG | TEMPERATURE: 98.5 F | HEART RATE: 84 BPM | SYSTOLIC BLOOD PRESSURE: 110 MMHG | OXYGEN SATURATION: 95 % | RESPIRATION RATE: 18 BRPM

## 2023-06-29 DIAGNOSIS — T81.89XD NON-HEALING SURGICAL WOUND, SUBSEQUENT ENCOUNTER: Primary | ICD-10-CM

## 2023-06-29 PROCEDURE — G0463 HOSPITAL OUTPT CLINIC VISIT: HCPCS | Mod: 25 | Performed by: FAMILY MEDICINE

## 2023-06-29 PROCEDURE — 11042 DBRDMT SUBQ TIS 1ST 20SQCM/<: CPT | Performed by: FAMILY MEDICINE

## 2023-06-29 ASSESSMENT — PAIN SCALES - GENERAL: PAINLEVEL: NO PAIN (0)

## 2023-06-29 NOTE — PROGRESS NOTES
Wound Clinic Note          Visit date: 06/29/2023       Cheif Complaint:     Cynthia Chawla is a 67 year old female had concerns including Wound Check (Right elbow).  She has a right elbow wound.      HISTORY OF PRESENT ILLNESS:    Cynthia Chawla reports the ulcer has been present since December 2022.  The wound began after a recent surgery and the incision did not heal normally.  She initially had a septic bursitis of the right elbow in December 2022 which self expressed and was ultimately treated with antibiotics, but a wound persisted.  She then sustained a fall in January 2023 landing on the same right elbow causing the wound to open up further.  She was evaluated at a local hospital and was determined to need an operative washout of the right elbow by orthopedic surgery which was performed.  Initially after being discharged from the hospital she followed up with the orthopedic surgeon.  She then was [place] [facility] [place], 2023.  She reports she has not seen the orthopedic surgeon since February and does not have another appointment scheduled with the orthopedic surgeon.  She was accompanied to the wound clinic for initial evaluation by her PCA who provided portions of the history.    Today she is again accompanied to the wound clinic by her PCA and they both provide portions of the interval history.    The patient confirms that she completed taking the antibiotics that I prescribed with no symptoms of an adverse reaction.  Patient and her PCA note that the redness resolved, the drainage decreased and the wound has not been as painful recently.    Since her last clinic visit her PCA has been changing the bandages every other day using alginate and a Mepilex bandage.  There is been light serous drainage from the wound.  There is been no difficulties with the dressing changes.  The PCA reports that they were not able to purchase the Vashe solution.    She has been using the sling since  her last clinic visit with me.      The pateint denies fevers or chills.  They report the pain from the wound has been 2/10 and has remained about the same recently.      Today the patient reports maintaining a high protein diet, but has not been taking protein supplements lately.        The patient denies a history of diabetes or chronic steroid use.  She is still not smoking cigarettes but she is still vaping and has stopped using the nicotine patches now.    The patient has not had any symptoms of infection relating to the wound recently and is not currently on antibiotics.       Problem List:   Past Medical History:   Diagnosis Date     Chronic pain      COPD (chronic obstructive pulmonary disease) (H)      DJD (degenerative joint disease)      Hepatitis      Major depression, recurrent (H)      Sleep apnea     have C-PAP,But don't really use it.              Family Hx: family history includes Asthma in her son; Cardiovascular in her maternal grandfather and mother.       Surgical Hx:   Past Surgical History:   Procedure Laterality Date     APPENDECTOMY       COLONOSCOPY       GYN SURGERY      tubal ligation     HEAD & NECK SURGERY      teeth ext     RECESSION RESECTION (REPAIR STRABISMUS) Right 10/22/2015    Procedure: RECESSION RESECTION (REPAIR STRABISMUS);  Surgeon: Hilario Hills MD;  Location: [facility]          Allergies:    Allergies   Allergen Reactions     Nefazodone      Nsaids      Has GFR <60     Tramadol      Seizures     Trazodone               Medication History:    Current Outpatient Medications   Medication Sig     Acetaminophen (TYLENOL PO) Take 500 mg by mouth every 6 hours as needed for mild pain or fever     albuterol (PROAIR HFA, PROVENTIL HFA, VENTOLIN HFA) 108 (90 BASE) MCG/ACT inhaler Inhale 2 puffs into the lungs every 4 hours as needed for shortness of breath / dyspnea or wheezing     amLODIPine (NORVASC) 5 MG tablet Take 5 mg by mouth daily Take 7.5mg daily     amLODIPine  (NORVASC) 5 MG tablet      aspirin (ASA) 81 MG chewable tablet CHEW AND SWALLOW 1 TABLET(81 MG) BY MOUTH DAILY     atorvastatin (LIPITOR) 20 MG tablet      BuPROPion HCl (WELLBUTRIN XL PO) Take 150 mg by mouth daily     calcium carb 1250 mg, 500 mg Circle,/vitamin D 200 units (OSCAL WITH D) 500-200 MG-UNIT per tablet Take 1 tablet by mouth 2 times daily (with meals)     cyclobenzaprine (FLEXERIL) 10 MG tablet      desonide (DESOWEN) 0.05 % ointment Apply topically 2 times daily     diazepam (VALIUM) 5 MG tablet Take one tablet 30 minutes prior lumbar injection due to severe anxiety     ferrous sulfate (FEROSUL) 325 (65 Fe) MG tablet Take 325 mg by mouth     FLUoxetine (PROZAC) 10 MG capsule      Gabapentin (NEURONTIN PO) Take 800 mg by mouth 3 times daily     gabapentin (NEURONTIN) 800 MG tablet      hydrOXYzine (ATARAX) 25 MG tablet TAKE 1 TO 2 TABLETS(25 TO 50 MG) BY MOUTH EVERY 6 HOURS AS NEEDED FOR ANXIETY     ibuprofen (ADVIL/MOTRIN) 600 MG tablet      IBUPROFEN 800 MG OR TABS 1 TABLET  DAILY AS NEEDED     ipratropium - albuterol 0.5 mg/2.5 mg/3 mL (DUONEB) 0.5-2.5 (3) MG/3ML nebulization Take 1 vial by nebulization as needed for shortness of breath or wheezing     melatonin 3 MG tablet Take 3 mg by mouth     Methadone HCl (METHADOSE PO) Take 100 mg by mouth     mometasone-formoterol (DULERA) 200-5 MCG/ACT oral inhaler Inhale 2 puffs into the lungs 2 times daily     Multiple Vitamins-Minerals (THERAPEUTIC-M) TABS Take 1 tablet by mouth daily     naloxone (NARCAN) 4 MG/0.1ML nasal spray Gently insert the tip of the nozzle into either nostril. Press the plunger firmly to give dose; remove device from nose. If no reaction in 2-3 minutes, give a second dose. Call 911 to prevent rebound overdose.     nitroGLYcerin (NITROSTAT) 0.4 MG sublingual tablet DISSOLVE 1 TABLET UNDER THE TONGUE AS NEEDED FOR CHEST PAIN     VITAMIN D, CHOLECALCIFEROL, PO Take 1,000 Units by mouth daily     XANAX 1 MG OR TABS      No current  facility-administered medications for this visit.         Tobacco History:  reports that she quit smoking about 10 years ago. Her smoking use included cigarettes. She smoked an average of 1 pack per day. She has never used smokeless tobacco.       REVIEW OF SYMPTOMS:   The review of systems was negative except as noted in the HPI.           PHYSICAL EXAMINATION:     /75   Pulse 84   Temp 98.5  F (36.9  C)   Resp 18   LMP 04/20/2008   SpO2 95%            GENERAL: The patient overall appears well and is no acute distress.   HEAD: normocephalic   EYES: Sclera and conjunctiva clear   NECK: no obvious masses   LUNGS: breathing is unlabored.   EXTREMITIES: No clubbing, cyanosis or edema   SKIN: No rashes or other abnormalities except as noted under the Wound section below.   NEUROLOGICAL: normal motor and sensory function       WOUND/ulcer: The wound appears healthy with no sign of infection.   Wound bed: necrotic material  Periwound: healthy intact skin  Today the undermining measurement is quite a bit less but the overall surface area and depth of the wound are still about the same.      Also see below for wound details:     Circumferential volume measures:             No data to display                Ulceration(s)/Wound(s):   Please see the media tab under the chart review for pictures of the wounds.  Nursing staff removed dressings and cleansed wound.    VASC Wound right elbow (Active)   Pre Size Length 2.2 06/29/23 0700   Pre Size Width 2.8 06/29/23 0700   Pre Size Depth 0.3 06/29/23 0700   Pre Total Sq cm 6.16 06/29/23 0700   Undermined 1cm @ 10 & 2 o'clock 06/29/23 0700           No results for input(s): HGBA1C, A1C, EAG in the last 67046 hours.    Invalid input(s): [id number]       Recent Labs   Lab Test 07/02/21  0510   ALBUMIN 3.5              Procedure note: , I had previous obtain informed consent from the patient to perform serial debridements, I confirmed this again with the patient today  verbally. , Anesthetized as needed with lidocaine. , Using a curette and/or a scalpel I performed an excisional debridement removing all necrotic material at the Right elbow wound down to the level of viable subcutaneous tissue.  I obtained hemostasis with direct pressure.  The patient tolerated the procedure well. , EBL: <5 ml  and Total debridement surface area: Less than 20 cm                  ASSESSMENT:   This is a 67 year old female with a right elbow surgical wound          PLAN:   We will switch to using Hydrofera Blue and a Mepilex bandage changed every other day.  I have encouraged her to continue to use the sling to minimize movement of the elbow joint.    I have encouraged her to be vigilant to make sure there is no pressure or friction applied to this area throughout the day.  I discussed with the patient and her PCA that if there is not any more substantial progress towards wound healing within the next visit or 2 we may need to consider having her go to see a different orthopedic surgeon to consider another surgery to clean out the area more effectively and possibly try to do a primary closure.  I have encouraged the patient to continue on their high protein diet to aid in wound healing.    I have also encouraged her to continue not to smoke cigarettes.  The patient will return to the wound clinic in 2-3 weeks to see me again.        30 minutes spent on the date of the encounter doing chart review, history and exam, documentation and further activities per the note      Kojo Osborn MD  06/29/2023   8:13 AM   [facility]  [phone]    This note was electronically signed by Kojo Osborn MD

## 2023-06-29 NOTE — PATIENT INSTRUCTIONS
Wound care supplies were ordered today through [employer] and if you are not receiving your supplies or have a question on your bill please contact Nata Squires at [phone]. Please allow 2-5 business days for delivery of supplies. You may get a call from [phone] if there are additional information Jeff needs. It is important to  or return their call. PLEASE NOTE: If you need to return your supplies, you MUST call customer service within 15 days of delivery date.         Wound Care Instructions    EVERY OTHER DAY and as needed, Cleanse your right elbow wound(s) with saline    Pat Dry with non-sterile gauze    Primary Dressing: Apply hydrofera blue ready transfer into/onto the wounds    Secondary dressing: Cover with mepilex    It is ok to get your wound wet in the bath or shower      If for some reason you are not able to get your dressing(s) changed as outlined above (due to illness, lack of supplies, lack of help) please do the following: remove old, soiled dressings; wash the wounds with saline; pat dry; apply ABD pad or other absorbant pad and secure with rolled gauze; avoid tape directly on your skin; Call the clinic as soon as possible to let us know what the current issues are in receiving wound care [phone].      SEEK MEDICAL CARE IF:  You have an increase in swelling, pain, or redness around the wound.  You have an increase in the amount of pus coming from the wound.  There is a bad smell coming from the wound.  The wound appears to be worsening/enlarging  You have a fever greater than 101.5 F      It is ok to continue current wound care treatment/products for the next 2-3 days until new wound care supplies are ordered and arrive. If longer than this please contact our office at [phone].    If you have a 2 layer or 4 layer compression wrap on these are safe to have on for ONLY 7 days. If for some reason you are not able to get the wrap(s) changed (due to illness; lack of supplies, lack  of help, lack of transportation) please do the following: unwrap the old 2 or 4 layer compression wrap; avoid using scissors as you could cut your skin and cause wounds; use tubular compression when available. Call to reschedule your home care or clinic visit appointment as soon as possible.    Please NOTE: if you are 15 minutes late to your clinic appointment you will have to be rescheduled. Please call our clinic as soon as possible if you know you will not be able to get to your appointment at [phone].    If you fail to show up to 3 scheduled clinic appointments you will be dismissed from our clinic.              We want to hear from you!  In the next few weeks, you should receive a call or email to complete a survey about your visit at [facility]. Please help us improve your appointment experience by letting us know how we did today. We strive to make your experience good and value any ways in which we could do better.      We value your input and suggestions.    Thank you for choosing the [facility]!

## 2023-06-29 NOTE — LETTER
"[facility]  [address] [place] [address] [address], [place] [address]  [phone]      Fax [phone]    [facility]           Fax: [phone]            Customer Service: [phone]        Account #: [id number]    Wound Dressing Rx and Order Form  Order Status: new  Verbal: Anay  Date: 2023     Cynthia Chawla  Gender: female  : 1955  [address]    [facility] [address]  [phone] (home)     Medical Record: [id number]  Primary Care Provider: Em Kaufman      ICD-10-CM    1. Non-healing surgical wound, subsequent encounter  T81.89XD Wound care            Insurance Info:  INSURER: Payor: MEDICA / Plan: MEDICA [employer] / Product Type: Indemnity /   Policy ID#:  [id number]  SECONDARY INSURANCE:    Secondary Policy ID#:  N/A        Physician Info:   Name:  MIKE DALEY     Dept Address/Phones:   [address] [place] [address], [address] [id number] [phone]  Fax: [phone]    Lymphedema circumferential measurements (in cm):       No data to display                  Wound info:  VASC Wound right elbow (Active)   Number of days: 36       VASC Wound right elbow (Active)   Pre Size Length 2.2 23 0700   Pre Size Width 2.8 23 0700   Pre Size Depth 0.3 23 0700   Pre Total Sq cm 6.16 23 0700   Post Size Length 2.2 23 0800   Post Size Width 2 23 0800   Post Size Depth 0.8 23 0800   Post Total Sq cm 4.4 23 0800   Undermined 1cm @ 10 & 2 o'clock 23 0700   Number of days: 36        Drainage: moderate  Thickness:  full  Duration of Need: 30 DAYS  Days Supply: 30 DAYS  Start Date: 2023  Starter Kit: Ancillary Kit (saline, gloves, gauze)  Qualifying wound/Debridement: Yes      Dressing Type Brand Size Frequency of change -or- Quantity   Primary Hydrofera blue ready transfer  4\"x5\" EVERY OTHER DAY and as needed    Mepilex border adhesive bandage  4\"x4\" " EVERY OTHER DAY and as needed     No substitutions preferred. Call [phone].         OK to forward to covered supplier.    Electronically Signed Physician:  MIKE DALEY             Date: June 29, 2023

## 2023-07-20 ENCOUNTER — OFFICE VISIT (OUTPATIENT)
Dept: VASCULAR SURGERY | Facility: CLINIC | Age: 68
End: 2023-07-20
Attending: FAMILY MEDICINE
Payer: COMMERCIAL

## 2023-07-20 VITALS
TEMPERATURE: 97.9 F | SYSTOLIC BLOOD PRESSURE: 122 MMHG | DIASTOLIC BLOOD PRESSURE: 88 MMHG | RESPIRATION RATE: 16 BRPM | HEART RATE: 80 BPM

## 2023-07-20 DIAGNOSIS — T81.89XD NON-HEALING SURGICAL WOUND, SUBSEQUENT ENCOUNTER: Primary | ICD-10-CM

## 2023-07-20 PROCEDURE — 11042 DBRDMT SUBQ TIS 1ST 20SQCM/<: CPT | Performed by: FAMILY MEDICINE

## 2023-07-20 PROCEDURE — G0463 HOSPITAL OUTPT CLINIC VISIT: HCPCS | Mod: 25 | Performed by: FAMILY MEDICINE

## 2023-07-20 ASSESSMENT — PAIN SCALES - GENERAL: PAINLEVEL: NO PAIN (0)

## 2023-07-20 NOTE — LETTER
"[facility]  [address] [place] [address] [address]  [place], [place] [address]  [phone]      Fax [phone]    [facility]           Fax: [phone]            Customer Service: [phone]        Account #: [id number]    Wound Dressing Rx and Order Form  Order Status: new  Verbal: Anay  Date: 2023     Cynthia Chawla  Gender: female  : 1955  [address]    [facility] [address]  [phone] (home)     Medical Record: [id number]  Primary Care Provider: Em Kaufman      ICD-10-CM    1. Non-healing surgical wound, subsequent encounter  T81.89XD DEBRIDE SKIN/SUBQ TISSUE     Wound care            Insurance Info:  INSURER: Payor: MEDICA / Plan: MEDICA D[employer] NON/[employer] PARTNERS / Product Type: Indemnity /   Policy ID#:  [id number]  SECONDARY INSURANCE:    Secondary Policy ID#:  N/A        Physician Info:   Name:  MIKE DALEY     Dept Address/Phones:   [address] [place] [address], [address]  [facility] [id number] [phone]  Fax: [phone]    Lymphedema circumferential measurements (in cm):       No data to display                  Wound info:  VASC Wound right elbow (Active)   Number of days: 57       VASC Wound right elbow (Active)   Pre Size Length 2.5 23 0900   Pre Size Width 2.1 23 0900   Pre Size Depth 0.3 23 0900   Pre Total Sq cm 5.25 23 0900   Post Size Length 2.5 23 0900   Post Size Width 2.1 23 0900   Post Size Depth 0.3 23 0900   Post Total Sq cm 5.25 23 0900   Undermined 1cm @ 10-1 o'clock 23 09   Number of days: 57        Drainage: moderate  Thickness:  full  Duration of Need: 30 DAYS  Days Supply: 30 DAYS  Start Date: 2023  Starter Kit: Ancillary Kit (saline, gloves, gauze)  Qualifying wound/Debridement: Yes      Dressing Type Brand Size Frequency of change -or- Quantity   Primary endoform  2\"x2\" EVERY OTHER DAY and as needed    Mepilex border adhesive bandage  " "3\"x3\" EVERY OTHER DAY and as needed     No substitutions preferred. Call [phone].         OK to forward to covered supplier.    Electronically Signed Physician:  MIKE DALEY             Date: July 20, 2023    "

## 2023-07-20 NOTE — PATIENT INSTRUCTIONS
Wound care supplies were ordered today through [employer] and if you are not receiving your supplies or have a question on your bill please contact Nata Squires at [phone]. Please allow 2-5 business days for delivery of supplies. You may get a call from a [phone] if there are additional information Jeff needs. It is important to  or return their call. PLEASE NOTE: If you need to return your supplies, you MUST call customer service within 15 days of delivery date.         Wound Care Instructions    EVERY OTHER DAY     1. Cleanse your wound with saline, pat dry. DO NOT REMOVE OLD ENDOFORM- LEAVE ON THE WOUND    2.  Cut Endoform to the size of the wound. It is okay if it overlap the edges a small amount.  Then, moisten the Endoform with a drop or two saline.    3. Apply Endoform to wound (over the old Endoform) then cover with mepilex     *Endoform is naturally used by the body over time so you may not find it in the wound when you change your dressing.  If you do find some, gently cleanse the wound with saline. Do not remove the remaining Endoform*    If for some reason you are not able to get your dressing(s) changed as outlined above (due to illness, lack of supplies, lack of help) please do the following: remove old, soiled dressings; wash the wounds with saline; pat dry; apply ABD pad or other absorbant pad and secure with rolled gauze; avoid tape directly on your skin; Call the clinic as soon as possible to let us know what the current issues are in receiving wound care [phone].      SEEK MEDICAL CARE IF:  You have an increase in swelling, pain, or redness around the wound.  You have an increase in the amount of pus coming from the wound.  There is a bad smell coming from the wound.  The wound appears to be worsening/enlarging  You have a fever greater than 101.5 F      It is ok to continue current wound care treatment/products for the next 2-3 days until new wound care supplies are ordered and  arrive. If longer than this please contact our office at [phone].    If you have a 2 layer or 4 layer compression wrap on these are safe to have on for ONLY 7 days. If for some reason you are not able to get the wrap(s) changed (due to illness; lack of supplies, lack of help, lack of transportation) please do the following: unwrap the old 2 or 4 layer compression wrap; avoid using scissors as you could cut your skin and cause wounds; use tubular compression when available. Call to reschedule your home care or clinic visit appointment as soon as possible.    Please NOTE: if you are 15 minutes late to your clinic appointment you will have to be rescheduled. Please call our clinic as soon as possible if you know you will not be able to get to your appointment at [phone].    If you fail to show up to 3 scheduled clinic appointments you will be dismissed from our clinic.              We want to hear from you!  In the next few weeks, you should receive a call or email to complete a survey about your visit at [facility]. Please help us improve your appointment experience by letting us know how we did today. We strive to make your experience good and value any ways in which we could do better.      We value your input and suggestions.    Thank you for choosing the [facility]!

## 2023-07-20 NOTE — PROGRESS NOTES
Wound Clinic Note          Visit date: 07/20/2023       Cheif Complaint:     Cynthia Chawla is a 67 year old female had concerns including right elbow wound.  She has a right elbow wound.      HISTORY OF PRESENT ILLNESS:    Cynthia Chawla reports the ulcer has been present since December 2022.  The wound began after a recent surgery and the incision did not heal normally.  She initially had a septic bursitis of the right elbow in December 2022 which self expressed and was ultimately treated with antibiotics, but a wound persisted.  She then sustained a fall in January 2023 landing on the same right elbow causing the wound to open up further.  She was evaluated at a local hospital and was determined to need an operative washout of the right elbow by orthopedic surgery which was performed.  Initially after being discharged from the hospital she followed up with the orthopedic surgeon.  She then was seen at the [facility] wound clinic on March 20, 2023.  She reports she has not seen the orthopedic surgeon since February and does not have another appointment scheduled with the orthopedic surgeon.  She was accompanied to the wound clinic for initial evaluation by her PCA who provided portions of the history.    Today she is again accompanied to the wound clinic by her PCA and they both provide portions of the interval history.      Since her last clinic visit her PCA has been changing the bandages every other day using Hydrofera Blue and a Mepilex bandage.  There is been light to moderate serous drainage from the wound.  There is been no difficulties with the dressing changes.      She has been using the sling since her last clinic visit with me.      The pateint denies fevers or chills.  They report the pain from the wound has been 2/10 and has remained about the same recently.      Today the patient reports maintaining a high protein diet and taking protein supplements regularly.        The patient denies a history  of diabetes or chronic steroid use.  She is still not smoking cigarettes but she is still vaping and has stopped using the nicotine patches now.    The patient has not had any symptoms of infection relating to the wound recently and is not currently on antibiotics.       Problem List:   Past Medical History:   Diagnosis Date     Chronic pain      COPD (chronic obstructive pulmonary disease) (H)      DJD (degenerative joint disease)      Hepatitis      Major depression, recurrent (H)      Sleep apnea     have C-PAP,But don't really use it.              Family Hx: family history includes Asthma in her son; Cardiovascular in her maternal grandfather and mother.       Surgical Hx:   Past Surgical History:   Procedure Laterality Date     APPENDECTOMY       COLONOSCOPY       GYN SURGERY      tubal ligation     HEAD & NECK SURGERY      teeth ext     RECESSION RESECTION (REPAIR STRABISMUS) Right 10/22/2015    Procedure: RECESSION RESECTION (REPAIR STRABISMUS);  Surgeon: Hilario Hills MD;  Location: [facility]          Allergies:    Allergies   Allergen Reactions     Nefazodone      Nsaids      Has GFR <60     Tramadol      Seizures     Trazodone               Medication History:    Current Outpatient Medications   Medication Sig     Acetaminophen (TYLENOL PO) Take 500 mg by mouth every 6 hours as needed for mild pain or fever     albuterol (PROAIR HFA, PROVENTIL HFA, VENTOLIN HFA) 108 (90 BASE) MCG/ACT inhaler Inhale 2 puffs into the lungs every 4 hours as needed for shortness of breath / dyspnea or wheezing     amLODIPine (NORVASC) 5 MG tablet Take 5 mg by mouth daily Take 7.5mg daily     aspirin (ASA) 81 MG chewable tablet CHEW AND SWALLOW 1 TABLET(81 MG) BY MOUTH DAILY     atorvastatin (LIPITOR) 20 MG tablet      BuPROPion HCl (WELLBUTRIN XL PO) Take 150 mg by mouth daily     calcium carb 1250 mg, 500 mg [place],/vitamin D 200 units (OSCAL WITH D) 500-200 MG-UNIT per tablet Take 1 tablet by mouth 2 times daily (with  meals)     cyclobenzaprine (FLEXERIL) 10 MG tablet      desonide (DESOWEN) 0.05 % ointment Apply topically 2 times daily     diazepam (VALIUM) 5 MG tablet Take one tablet 30 minutes prior lumbar injection due to severe anxiety     ferrous sulfate (FEROSUL) 325 (65 Fe) MG tablet Take 325 mg by mouth     FLUoxetine (PROZAC) 10 MG capsule      gabapentin (NEURONTIN) 800 MG tablet      hydrOXYzine (ATARAX) 25 MG tablet TAKE 1 TO 2 TABLETS(25 TO 50 MG) BY MOUTH EVERY 6 HOURS AS NEEDED FOR ANXIETY     ibuprofen (ADVIL/MOTRIN) 600 MG tablet      ipratropium - albuterol 0.5 mg/2.5 mg/3 mL (DUONEB) 0.5-2.5 (3) MG/3ML nebulization Take 1 vial by nebulization as needed for shortness of breath or wheezing     melatonin 3 MG tablet Take 3 mg by mouth     Methadone HCl (METHADOSE PO) Take 100 mg by mouth     mometasone-formoterol (DULERA) 200-5 MCG/ACT oral inhaler Inhale 2 puffs into the lungs 2 times daily     Multiple Vitamins-Minerals (THERAPEUTIC-M) TABS Take 1 tablet by mouth daily     naloxone (NARCAN) 4 MG/0.1ML nasal spray Gently insert the tip of the nozzle into either nostril. Press the plunger firmly to give dose; remove device from nose. If no reaction in 2-3 minutes, give a second dose. Call 911 to prevent rebound overdose.     nitroGLYcerin (NITROSTAT) 0.4 MG sublingual tablet DISSOLVE 1 TABLET UNDER THE TONGUE AS NEEDED FOR CHEST PAIN     VITAMIN D, CHOLECALCIFEROL, PO Take 1,000 Units by mouth daily     XANAX 1 MG OR TABS      amLODIPine (NORVASC) 5 MG tablet      Gabapentin (NEURONTIN PO) Take 800 mg by mouth 3 times daily     IBUPROFEN 800 MG OR TABS 1 TABLET  DAILY AS NEEDED     No current facility-administered medications for this visit.         Tobacco History:  reports that she quit smoking about 10 years ago. Her smoking use included cigarettes. She smoked an average of 1 pack per day. She has never used smokeless tobacco.       REVIEW OF SYMPTOMS:   The review of systems was negative except as noted in  the HPI.           PHYSICAL EXAMINATION:     /88   Pulse 80   Temp 97.9  F (36.6  C)   Resp 16   LMP 04/20/2008            GENERAL: The patient overall appears well and is no acute distress.   HEAD: normocephalic   EYES: Sclera and conjunctiva clear   NECK: no obvious masses   LUNGS: breathing is unlabored.   EXTREMITIES: No clubbing, cyanosis or edema   SKIN: No rashes or other abnormalities except as noted under the Wound section below.   NEUROLOGICAL: normal motor and sensory function       WOUND/ulcer: The wound appears healthy with no sign of infection.   Wound bed: necrotic material  Periwound: healthy intact skin  Today the undermining measurements and all the other wound measurements are about the same compared with her last clinic visit.      Also see below for wound details:     Circumferential volume measures:             No data to display                Ulceration(s)/Wound(s):   Please see the media tab under the chart review for pictures of the wounds.  Nursing staff removed dressings and cleansed wound.    VASC Wound right elbow (Active)   Pre Size Length 2.5 07/20/23 0900   Pre Size Width 2.1 07/20/23 0900   Pre Size Depth 0.3 07/20/23 0900   Pre Total Sq cm 5.25 07/20/23 0900   Undermined 1cm @ 10-1 o'clock 07/20/23 0900           No results for input(s): HGBA1C, A1C, EAG in the last 67414 hours.    Invalid input(s): [id number]       Recent Labs   Lab Test 07/02/21  0510   ALBUMIN 3.5              Procedure note: , I had previous obtain informed consent from the patient to perform serial debridements, I confirmed this again with the patient today verbally. , Anesthetized as needed with lidocaine. , Using a curette and/or a scalpel I performed an excisional debridement removing all necrotic material at the Right elbow wound down to the level of viable subcutaneous tissue.  I obtained hemostasis with direct pressure.  The patient tolerated the procedure well. , EBL: <5 ml  and Total  debridement surface area: Less than 20 cm                  ASSESSMENT:   This is a 67 year old female with a right elbow surgical wound          PLAN:   We will switch to using endoform and a Mepilex bandage changed every other day.  I have encouraged her to continue to use the sling to minimize movement of the elbow joint.  I again had a long discussion with the patient about going back to see the orthopedic surgeon for a more thorough debridement and possibly closing the wound primarily or with a full-thickness skin graft.  The patient would prefer to continue with our current efforts here in the wound clinic rather than seeing the surgeon for now.  I have encouraged her to be vigilant to make sure there is no pressure or friction applied to this area throughout the day.    I have encouraged the patient to continue on their high protein diet to aid in wound healing.    I have also encouraged her to continue not to smoke cigarettes.  The patient will return to the wound clinic in 4 weeks to see me again.        30 minutes spent on the date of the encounter doing chart review, history and exam, documentation and further activities per the note      Kojo Osborn MD  07/20/2023   9:43 AM   [facility]  [phone]    This note was electronically signed by Kojo Osborn MD

## 2023-08-17 NOTE — PATIENT INSTRUCTIONS
Wound care supplies were ordered today through [employer] and if you are not receiving your supplies or have a question on your bill please contact Nata Squires at [phone]. Please allow 2-5 business days for delivery of supplies. You may get a call from a [phone] if there are additional information Jeff needs. It is important to  or return their call. PLEASE NOTE: If you need to return your supplies, you MUST call customer service within 15 days of delivery date.         Wound Care Instructions    EVERY OTHER DAY and as needed, Cleanse your right elbow wound(s) with Normal saline.    Pat Dry with non-sterile gauze    Apply Lotion to the intact skin surrounding your wound and other dry skin locations. Some good lotions include: Remedy Skin Repair Cream, Sarna, Vanicream or Cetaphil    Primary Dressing: Apply hydrofera blue into/onto the wounds    Secondary dressing: Cover with mepilex    It is ok to get your wound wet in the bath or shower      If for some reason you are not able to get your dressing(s) changed as outlined above (due to illness, lack of supplies, lack of help) please do the following: remove old, soiled dressings; wash the wounds with saline; pat dry; apply ABD pad or other absorbant pad and secure with rolled gauze; avoid tape directly on your skin; Call the clinic as soon as possible to let us know what the current issues are in receiving wound care [phone].      SEEK MEDICAL CARE IF:  You have an increase in swelling, pain, or redness around the wound.  You have an increase in the amount of pus coming from the wound.  There is a bad smell coming from the wound.  The wound appears to be worsening/enlarging  You have a fever greater than 101.5 F      It is ok to continue current wound care treatment/products for the next 2-3 days until new wound care supplies are ordered and arrive. If longer than this please contact our office at [phone].    If you have a 2 layer or 4 layer  compression wrap on these are safe to have on for ONLY 7 days. If for some reason you are not able to get the wrap(s) changed (due to illness; lack of supplies, lack of help, lack of transportation) please do the following: unwrap the old 2 or 4 layer compression wrap; avoid using scissors as you could cut your skin and cause wounds; use tubular compression when available. Call to reschedule your home care or clinic visit appointment as soon as possible.    Please NOTE: if you are 15 minutes late to your clinic appointment you will have to be rescheduled. Please call our clinic as soon as possible if you know you will not be able to get to your appointment at [phone].    If you fail to show up to 3 scheduled clinic appointments you will be dismissed from our clinic.              We want to hear from you!  In the next few weeks, you should receive a call or email to complete a survey about your visit at [facility]. Please help us improve your appointment experience by letting us know how we did today. We strive to make your experience good and value any ways in which we could do better.      We value your input and suggestions.    Thank you for choosing the [facility]!

## 2023-08-24 ENCOUNTER — OFFICE VISIT (OUTPATIENT)
Dept: VASCULAR SURGERY | Facility: CLINIC | Age: 68
End: 2023-08-24
Attending: FAMILY MEDICINE
Payer: COMMERCIAL

## 2023-08-24 VITALS
DIASTOLIC BLOOD PRESSURE: 91 MMHG | OXYGEN SATURATION: 93 % | TEMPERATURE: 97.7 F | HEART RATE: 86 BPM | SYSTOLIC BLOOD PRESSURE: 137 MMHG

## 2023-08-24 DIAGNOSIS — T81.89XD NON-HEALING SURGICAL WOUND, SUBSEQUENT ENCOUNTER: Primary | ICD-10-CM

## 2023-08-24 PROCEDURE — 99214 OFFICE O/P EST MOD 30 MIN: CPT | Performed by: FAMILY MEDICINE

## 2023-08-24 PROCEDURE — G0463 HOSPITAL OUTPT CLINIC VISIT: HCPCS | Performed by: FAMILY MEDICINE

## 2023-08-24 ASSESSMENT — PAIN SCALES - GENERAL: PAINLEVEL: MILD PAIN (2)

## 2023-08-24 NOTE — LETTER
"[facility]  [address] [place] [address] [address]  [place], [place] [address]  [phone]      Fax [phone]    [facility]           Fax: [phone]            Customer Service: [phone]        Account #: [id number]    Wound Dressing Rx and Order Form  Order Status: refill  Verbal: Anay  Date: 2023     Cynthia Chawla  Gender: female  : 1955  [address]    [facility] [address]  [phone] (home)     Medical Record: [id number]  Primary Care Provider: Em Kaufamn      ICD-10-CM    1. Non-healing surgical wound, subsequent encounter  T81.89XD Wound care            Insurance Info:  INSURER: Payor: MEDICA / Plan: MEDICA [employer] / Product Type: Indemnity /   Policy ID#:  [id number]  SECONDARY INSURANCE:    Secondary Policy ID#:  N/A        Physician Info:   Name:  MIKE DALEY     Dept Address/Phones:   [address] [place] [address], [address] [id number] [phone]  Fax: [phone]    Lymphedema circumferential measurements (in cm):       No data to display                  Wound info:  VASC Wound right elbow (Active)   Number of days: 92       VASC Wound right elbow (Active)   Pre Size Length 2.2 23 1100   Pre Size Width 2 23 1100   Pre Size Depth 0.2 23 1100   Pre Total Sq cm 4.4 23 1100   Post Size Length 2.5 23 0900   Post Size Width 2.1 23 0900   Post Size Depth 0.3 23 0900   Post Total Sq cm 5.25 23 0900   Undermined 1.7 @ 10-1 o'clock 23 1100   Number of days: 92        Drainage: moderate  Thickness:  full  Duration of Need: 30 DAYS  Days Supply: 30 DAYS  Start Date: 2023  Starter Kit: Ancillary Kit (saline, gloves, gauze)  Qualifying wound/Debridement: Yes      Dressing Type Brand Size Frequency of change -or- Quantity   Primary Hydrofera blue ready transfer  4\"x5\" EVERY OTHER DAY and as needed    Mepilex border adhesive bandage  4\"x4\" " "EVERY OTHER DAY and as needed    Sof form roll gauze  4\"x75\" EVERY OTHER DAY and as needed     No substitutions preferred. Call [phone].         OK to forward to covered supplier.    Electronically Signed Physician:  MIKE DALEY             Date: August 24, 2023    "

## 2023-08-24 NOTE — PROGRESS NOTES
Wound Clinic Note          Visit date: 08/24/2023       Cheif Complaint:     Cynthia Chawla is a 67 year old female had concerns including Wound Check (Right elbow wound).  She has a right elbow wound.      HISTORY OF PRESENT ILLNESS:    Cynthia Chawla reports the ulcer has been present since December 2022.  The wound began after a recent surgery and the incision did not heal normally.  She initially had a septic bursitis of the right elbow in December 2022 which self expressed and was ultimately treated with antibiotics, but a wound persisted.  She then sustained a fall in January 2023 landing on the same right elbow causing the wound to open up further.  She was evaluated at a local hospital and was determined to need an operative washout of the right elbow by orthopedic surgery which was performed.  Initially after being discharged from the hospital she followed up with the orthopedic surgeon.  She then was [place] [facility] [place], 2023.  She reports she has not seen the orthopedic surgeon since February and does not have another appointment scheduled with the orthopedic surgeon.  She was accompanied to the wound clinic for initial evaluation by her PCA who provided portions of the history.    Today she is again accompanied to the wound clinic by her PCA and they both provide portions of the interval history.      Since her last clinic visit her PCA has been changing the bandages every other day using endoform and then Hydrofera Blue and a Mepilex bandage.  There is been light to moderate serous drainage from the wound.  There is been no difficulties with the dressing changes.  At her last clinic visit we had suggested switching from Hydrofera Blue to the endoform.  She reports that she is decided to just continue using both and feels that that is working better.    She has been using elbow immobilizer for tennis elbow since her last clinic visit with me.      The pateint denies fevers  or chills.  They report the pain from the wound has been 0/10 and has remained about the same recently.      Today the patient reports maintaining a high protein diet and taking protein supplements regularly.        The patient denies a history of diabetes or chronic steroid use.  Today she reports she is still not smoking cigarettes and has actually stopped vaping as well.    The patient has not had any symptoms of infection relating to the wound recently and is not currently on antibiotics.       Problem List:   Past Medical History:   Diagnosis Date    Chronic pain     COPD (chronic obstructive pulmonary disease) (H)     DJD (degenerative joint disease)     Hepatitis     Major depression, recurrent (H)     Sleep apnea     have C-PAP,But don't really use it.              Family Hx: family history includes Asthma in her son; Cardiovascular in her maternal grandfather and mother.       Surgical Hx:   Past Surgical History:   Procedure Laterality Date    APPENDECTOMY      COLONOSCOPY      GYN SURGERY      tubal ligation    HEAD & NECK SURGERY      teeth ext    RECESSION RESECTION (REPAIR STRABISMUS) Right 10/22/2015    Procedure: RECESSION RESECTION (REPAIR STRABISMUS);  Surgeon: Hilario Hills MD;  Location: [facility]          Allergies:    Allergies   Allergen Reactions    Nefazodone     Nsaids      Has GFR <60    Tramadol      Seizures    Trazodone               Medication History:    Current Outpatient Medications   Medication Sig    Acetaminophen (TYLENOL PO) Take 500 mg by mouth every 6 hours as needed for mild pain or fever    albuterol (PROAIR HFA, PROVENTIL HFA, VENTOLIN HFA) 108 (90 BASE) MCG/ACT inhaler Inhale 2 puffs into the lungs every 4 hours as needed for shortness of breath / dyspnea or wheezing    amLODIPine (NORVASC) 5 MG tablet Take 5 mg by mouth daily Take 7.5mg daily    amLODIPine (NORVASC) 5 MG tablet     aspirin (ASA) 81 MG chewable tablet CHEW AND SWALLOW 1 TABLET(81 MG) BY MOUTH DAILY     atorvastatin (LIPITOR) 20 MG tablet     BuPROPion HCl (WELLBUTRIN XL PO) Take 150 mg by mouth daily    calcium carb 1250 mg, 500 mg [place],/vitamin D 200 units (OSCAL WITH D) 500-200 MG-UNIT per tablet Take 1 tablet by mouth 2 times daily (with meals)    cyclobenzaprine (FLEXERIL) 10 MG tablet     desonide (DESOWEN) 0.05 % ointment Apply topically 2 times daily    diazepam (VALIUM) 5 MG tablet Take one tablet 30 minutes prior lumbar injection due to severe anxiety    ferrous sulfate (FEROSUL) 325 (65 Fe) MG tablet Take 325 mg by mouth    FLUoxetine (PROZAC) 10 MG capsule     Gabapentin (NEURONTIN PO) Take 800 mg by mouth 3 times daily    gabapentin (NEURONTIN) 800 MG tablet     hydrOXYzine (ATARAX) 25 MG tablet TAKE 1 TO 2 TABLETS(25 TO 50 MG) BY MOUTH EVERY 6 HOURS AS NEEDED FOR ANXIETY    ibuprofen (ADVIL/MOTRIN) 600 MG tablet     IBUPROFEN 800 MG OR TABS 1 TABLET  DAILY AS NEEDED    ipratropium - albuterol 0.5 mg/2.5 mg/3 mL (DUONEB) 0.5-2.5 (3) MG/3ML nebulization Take 1 vial by nebulization as needed for shortness of breath or wheezing    melatonin 3 MG tablet Take 3 mg by mouth    Methadone HCl (METHADOSE PO) Take 100 mg by mouth    mometasone-formoterol (DULERA) 200-5 MCG/ACT oral inhaler Inhale 2 puffs into the lungs 2 times daily    Multiple Vitamins-Minerals (THERAPEUTIC-M) TABS Take 1 tablet by mouth daily    naloxone (NARCAN) 4 MG/0.1ML nasal spray Gently insert the tip of the nozzle into either nostril. Press the plunger firmly to give dose; remove device from nose. If no reaction in 2-3 minutes, give a second dose. Call 911 to prevent rebound overdose.    nitroGLYcerin (NITROSTAT) 0.4 MG sublingual tablet DISSOLVE 1 TABLET UNDER THE TONGUE AS NEEDED FOR CHEST PAIN    VITAMIN D, CHOLECALCIFEROL, PO Take 1,000 Units by mouth daily    XANAX 1 MG OR TABS      No current facility-administered medications for this visit.         Tobacco History:  reports that she has been smoking cigarettes and vaping  device. She has been smoking an average of 1 pack per day. She has never used smokeless tobacco.       REVIEW OF SYMPTOMS:   The review of systems was negative except as noted in the HPI.           PHYSICAL EXAMINATION:     BP (!) 137/91   Pulse 86   Temp 97.7  F (36.5  C) (Oral)   LMP 04/20/2008   SpO2 93%            GENERAL: The patient overall appears well and is no acute distress.   HEAD: normocephalic   EYES: Sclera and conjunctiva clear   NECK: no obvious masses   LUNGS: breathing is unlabored.   EXTREMITIES: No clubbing, cyanosis or edema   SKIN: No rashes or other abnormalities except as noted under the Wound section below.   NEUROLOGICAL: normal motor and sensory function       WOUND/ulcer: The wound appears healthy with no sign of infection.   Wound bed: granulation tissue  Periwound: healthy intact skin  The wound bed appears much healthier today with all granulation tissue and has filled in more also the surface area measurements are less compared with her last clinic visit.      Also see below for wound details:     Circumferential volume measures:             No data to display                Ulceration(s)/Wound(s):   Please see the media tab under the chart review for pictures of the wounds.  Nursing staff removed dressings and cleansed wound.    VASC Wound right elbow (Active)   Pre Size Length 2.2 08/24/23 1100   Pre Size Width 2 08/24/23 1100   Pre Size Depth 0.2 08/24/23 1100   Pre Total Sq cm 4.4 08/24/23 1100   Undermined 1.7 @ 10-1 o'clock 08/24/23 1100             No results for input(s): HGBA1C, A1C, EAG in the last 78203 hours.    Invalid input(s): [id number]       Recent Labs   Lab Test 07/02/21  0510   ALBUMIN 3.5              No debridement performed today.                  ASSESSMENT:   This is a 67 year old female with a right elbow surgical wound          PLAN:   They can continue using endoform, Hydrofera Blue and a Mepilex bandage changed every other day.  I have encouraged her  to continue to use the sling to minimize movement of the elbow joint.    I have encouraged her to be vigilant to make sure there is no pressure or friction applied to this area throughout the day.    I have encouraged the patient to continue on their high protein diet to aid in wound healing.    I have also encouraged her to continue not to smoke cigarettes.  The patient will return to the wound clinic in 4 weeks to see me again.        30 minutes spent on the date of the encounter doing chart review, history and exam, documentation and further activities per the note      Kojo Osborn MD  08/24/2023   11:26 AM   [facility]  [phone]    This note was electronically signed by Kojo Osborn MD

## 2023-09-22 NOTE — PATIENT INSTRUCTIONS
Wound care supplies were ordered today through [employer] and if you are not receiving your supplies or have a question on your bill please contact Nata Squires at [phone]. Please allow 2-5 business days for delivery of supplies. You may get a call from a [phone] if there are additional information Jeff needs. It is important to  or return their call. PLEASE NOTE: If you need to return your supplies, you MUST call customer service within 15 days of delivery date.         Wound Care Instructions    EVERY OTHER DAY and as needed, Cleanse your right elbow wound(s) with Normal saline.    Pat Dry with non-sterile gauze    Apply Lotion to the intact skin surrounding your wound and other dry skin locations. Some good lotions include: Remedy Skin Repair Cream, Sarna, Vanicream or Cetaphil    Primary Dressing: Apply endoform into/onto the wounds    Secondary dressing: Cover with mepilex    It is ok to get your wound wet in the bath or shower      If for some reason you are not able to get your dressing(s) changed as outlined above (due to illness, lack of supplies, lack of help) please do the following: remove old, soiled dressings; wash the wounds with saline; pat dry; apply ABD pad or other absorbant pad and secure with rolled gauze; avoid tape directly on your skin; Call the clinic as soon as possible to let us know what the current issues are in receiving wound care [phone].      SEEK MEDICAL CARE IF:  You have an increase in swelling, pain, or redness around the wound.  You have an increase in the amount of pus coming from the wound.  There is a bad smell coming from the wound.  The wound appears to be worsening/enlarging  You have a fever greater than 101.5 F      It is ok to continue current wound care treatment/products for the next 2-3 days until new wound care supplies are ordered and arrive. If longer than this please contact our office at [phone].    If you have a 2 layer or 4 layer  compression wrap on these are safe to have on for ONLY 7 days. If for some reason you are not able to get the wrap(s) changed (due to illness; lack of supplies, lack of help, lack of transportation) please do the following: unwrap the old 2 or 4 layer compression wrap; avoid using scissors as you could cut your skin and cause wounds; use tubular compression when available. Call to reschedule your home care or clinic visit appointment as soon as possible.    Please NOTE: if you are 15 minutes late to your clinic appointment you will have to be rescheduled. Please call our clinic as soon as possible if you know you will not be able to get to your appointment at [phone].    If you fail to show up to 3 scheduled clinic appointments you will be dismissed from our clinic.              We want to hear from you!  In the next few weeks, you should receive a call or email to complete a survey about your visit at [facility]. Please help us improve your appointment experience by letting us know how we did today. We strive to make your experience good and value any ways in which we could do better.      We value your input and suggestions.    Thank you for choosing the [facility]!

## 2023-09-28 ENCOUNTER — OFFICE VISIT (OUTPATIENT)
Dept: VASCULAR SURGERY | Facility: CLINIC | Age: 68
End: 2023-09-28
Attending: FAMILY MEDICINE
Payer: COMMERCIAL

## 2023-09-28 VITALS
DIASTOLIC BLOOD PRESSURE: 80 MMHG | OXYGEN SATURATION: 93 % | TEMPERATURE: 97.9 F | RESPIRATION RATE: 18 BRPM | SYSTOLIC BLOOD PRESSURE: 143 MMHG | HEART RATE: 85 BPM

## 2023-09-28 DIAGNOSIS — T81.89XD NON-HEALING SURGICAL WOUND, SUBSEQUENT ENCOUNTER: Primary | ICD-10-CM

## 2023-09-28 PROCEDURE — G0463 HOSPITAL OUTPT CLINIC VISIT: HCPCS | Performed by: FAMILY MEDICINE

## 2023-09-28 PROCEDURE — 99214 OFFICE O/P EST MOD 30 MIN: CPT | Performed by: FAMILY MEDICINE

## 2023-09-28 ASSESSMENT — PAIN SCALES - GENERAL: PAINLEVEL: MODERATE PAIN (4)

## 2023-09-28 NOTE — LETTER
"[facility]  [address] [place] [address] [address]  [place], [place] [address]  [phone]      Fax [phone]    [facility]           Fax: [phone]            Customer Service: [phone]        Account #: [id number]    Wound Dressing Rx and Order Form  Order Status: refill  Verbal: Anay  Date: 2023     Cynthia Chawla  Gender: female  : 1955  [address]    [facility] [address]  [phone] (home)     Medical Record: [id number]  Primary Care Provider: Em Kaufman      ICD-10-CM    1. Non-healing surgical wound, subsequent encounter  T81.89XD Wound care            Insurance Info:  INSURER: Payor: MEDICA / Plan: [employer] / Product Type: Indemnity /   Policy ID#:  [id number]  SECONDARY INSURANCE:    Secondary Policy ID#:  N/A        Physician Info:   Name:  MIKE DALEY     Dept Address/Phones:   [address] [place] [address], [address] [id number] [phone]  Fax: [phone]    Lymphedema circumferential measurements (in cm):       No data to display                  Wound info:  VASC Wound right elbow (Active)   Number of days: 127       VASC Wound right elbow (Active)   Pre Size Length 2 23 0900   Pre Size Width 2 23 0900   Pre Size Depth 0.2 23 0900   Pre Total Sq cm 4 23 0900   Post Size Length 2 23 0900   Post Size Width 2 23 0900   Post Size Depth 0.2 23 0900   Post Total Sq cm 4 23 0900   Undermined 1.5 @ 10-1 o clock 23 0900   Number of days: 127        Drainage: moderate  Thickness:  full  Duration of Need: 30 DAYS  Days Supply: 30 DAYS  Start Date: 2023  Starter Kit: Ancillary Kit (saline, gloves, gauze)  Qualifying wound/Debridement: Yes      Dressing Type Brand Size Frequency of change -or- Quantity   Primary endoform  2\"x2\" EVERY OTHER DAY and as needed    Mepilex border adhesive bandage  4\"x4\" EVERY OTHER DAY and as " needed     No substitutions preferred. Call [phone].         OK to forward to covered supplier.    Electronically Signed Physician:  MIKE DALEY             Date: September 28, 2023

## 2023-09-28 NOTE — PROGRESS NOTES
Wound Clinic Note          Visit date: 09/28/2023       Cheif Complaint:     Cynthia Chawla is a 68 year old female had concerns including Rt elbow wound.  She has a right elbow wound.      HISTORY OF PRESENT ILLNESS:    Cynthia Chawla reports the ulcer has been present since December 2022.  The wound began after a recent surgery and the incision did not heal normally.  She initially had a septic bursitis of the right elbow in December 2022 which self expressed and was ultimately treated with antibiotics, but a wound persisted.  She then sustained a fall in January 2023 landing on the same right elbow causing the wound to open up further.  She was evaluated at a local hospital and was determined to need an operative washout of the right elbow by orthopedic surgery which was performed.  Initially after being discharged from the hospital she followed up with the orthopedic surgeon.  She then was seen at the [facility] wound clinic on March 20, 2023.  She reports she has not seen the orthopedic surgeon since February and does not have another appointment scheduled with the orthopedic surgeon.  She was accompanied to the wound clinic for initial evaluation by her PCA who provided portions of the history.    Today she is again accompanied to the wound clinic by her PCA and they both provide portions of the interval history.      Since her last clinic visit her PCA has been changing the bandages every other day using endoform and then Hydrofera Blue and a Mepilex bandage.  There is been light to moderate serous drainage from the wound.  There is been no difficulties with the dressing changes.      She has been using elbow immobilizer for tennis elbow.      The pateint denies fevers or chills.  They report the pain from the wound has been 0/10 and has remained about the same recently.      Today the patient reports maintaining a high protein diet and taking protein supplements regularly.        The patient denies a  history of diabetes or chronic steroid use.  Today she reports she has not been smoking recently but does occasionally vape.    The patient has not had any symptoms of infection relating to the wound recently and is not currently on antibiotics.       Problem List:   Past Medical History:   Diagnosis Date    Chronic pain     COPD (chronic obstructive pulmonary disease) (H)     DJD (degenerative joint disease)     Hepatitis     Major depression, recurrent (H)     Sleep apnea     have C-PAP,But don't really use it.              Family Hx: family history includes Asthma in her son; Cardiovascular in her maternal grandfather and mother.       Surgical Hx:   Past Surgical History:   Procedure Laterality Date    APPENDECTOMY      COLONOSCOPY      GYN SURGERY      tubal ligation    HEAD & NECK SURGERY      teeth ext    RECESSION RESECTION (REPAIR STRABISMUS) Right 10/22/2015    Procedure: RECESSION RESECTION (REPAIR STRABISMUS);  Surgeon: Hilario Hills MD;  [facility]          Allergies:    Allergies   Allergen Reactions    Nefazodone     Nsaids      Has GFR <60    Tramadol      Seizures    Trazodone               Medication History:    Current Outpatient Medications   Medication Sig    Acetaminophen (TYLENOL PO) Take 500 mg by mouth every 6 hours as needed for mild pain or fever    albuterol (PROAIR HFA, PROVENTIL HFA, VENTOLIN HFA) 108 (90 BASE) MCG/ACT inhaler Inhale 2 puffs into the lungs every 4 hours as needed for shortness of breath / dyspnea or wheezing    amLODIPine (NORVASC) 5 MG tablet Take 5 mg by mouth daily Take 7.5mg daily    amLODIPine (NORVASC) 5 MG tablet     aspirin (ASA) 81 MG chewable tablet CHEW AND SWALLOW 1 TABLET(81 MG) BY MOUTH DAILY    atorvastatin (LIPITOR) 20 MG tablet     BuPROPion HCl (WELLBUTRIN XL PO) Take 150 mg by mouth daily    calcium carb 1250 mg, 500 mg [place],/vitamin D 200 units (OSCAL WITH D) 500-200 MG-UNIT per tablet Take 1 tablet by mouth 2 times daily (with  meals)    cyclobenzaprine (FLEXERIL) 10 MG tablet     desonide (DESOWEN) 0.05 % ointment Apply topically 2 times daily    diazepam (VALIUM) 5 MG tablet Take one tablet 30 minutes prior lumbar injection due to severe anxiety    ferrous sulfate (FEROSUL) 325 (65 Fe) MG tablet Take 325 mg by mouth    FLUoxetine (PROZAC) 10 MG capsule     Gabapentin (NEURONTIN PO) Take 800 mg by mouth 3 times daily    gabapentin (NEURONTIN) 800 MG tablet     hydrOXYzine (ATARAX) 25 MG tablet TAKE 1 TO 2 TABLETS(25 TO 50 MG) BY MOUTH EVERY 6 HOURS AS NEEDED FOR ANXIETY    ibuprofen (ADVIL/MOTRIN) 600 MG tablet     IBUPROFEN 800 MG OR TABS 1 TABLET  DAILY AS NEEDED    ipratropium - albuterol 0.5 mg/2.5 mg/3 mL (DUONEB) 0.5-2.5 (3) MG/3ML nebulization Take 1 vial by nebulization as needed for shortness of breath or wheezing    melatonin 3 MG tablet Take 3 mg by mouth    Methadone HCl (METHADOSE PO) Take 100 mg by mouth    mometasone-formoterol (DULERA) 200-5 MCG/ACT oral inhaler Inhale 2 puffs into the lungs 2 times daily    Multiple Vitamins-Minerals (THERAPEUTIC-M) TABS Take 1 tablet by mouth daily    naloxone (NARCAN) 4 MG/0.1ML nasal spray Gently insert the tip of the nozzle into either nostril. Press the plunger firmly to give dose; remove device from nose. If no reaction in 2-3 minutes, give a second dose. Call 911 to prevent rebound overdose.    nitroGLYcerin (NITROSTAT) 0.4 MG sublingual tablet DISSOLVE 1 TABLET UNDER THE TONGUE AS NEEDED FOR CHEST PAIN    VITAMIN D, CHOLECALCIFEROL, PO Take 1,000 Units by mouth daily    XANAX 1 MG OR TABS      No current facility-administered medications for this visit.         Tobacco History:  reports that she has been smoking cigarettes and vaping device. She has been smoking an average of 1 pack per day. She has never used smokeless tobacco.       REVIEW OF SYMPTOMS:   The review of systems was negative except as noted in the HPI.           PHYSICAL EXAMINATION:     BP (!) 143/80   Pulse 85    Temp 97.9  F (36.6  C)   Resp 18   LMP 04/20/2008   SpO2 93%            GENERAL: The patient overall appears well and is no acute distress.   HEAD: normocephalic   EYES: Sclera and conjunctiva clear   NECK: no obvious masses   LUNGS: breathing is unlabored.   EXTREMITIES: No clubbing, cyanosis or edema   SKIN: No rashes or other abnormalities except as noted under the Wound section below.   NEUROLOGICAL: normal motor and sensory function       WOUND/ulcer: The wound appears healthy with no sign of infection.   Wound bed: granulation tissue  Periwound: healthy intact skin  The wound bed appears much healthier today with all granulation tissue and has filled in more also the undermining measurements are less compared with her last clinic visit.      Also see below for wound details:     Circumferential volume measures:             No data to display                Ulceration(s)/Wound(s):   Please see the media tab under the chart review for pictures of the wounds.  Nursing staff removed dressings and cleansed wound.    VASC Wound right elbow (Active)   Pre Size Length 2 09/28/23 0900   Pre Size Width 2 09/28/23 0900   Pre Size Depth 0.2 09/28/23 0900   Undermined 1.5 @ 10-1 o clock 09/28/23 0900               No results for input(s): HGBA1C, A1C, EAG in the last 71928 hours.    Invalid input(s): [id number]       Recent Labs   Lab Test 07/02/21  0510   ALBUMIN 3.5              No debridement performed today.                  ASSESSMENT:   This is a 68 year old female with a right elbow surgical wound          PLAN:   They can continue using endoform, Hydrofera Blue and a Mepilex bandage changed every other day.  I have encouraged her to continue to use the sling to minimize movement of the elbow joint.    I have encouraged her to be vigilant to make sure there is no pressure or friction applied to this area throughout the day.    I have encouraged the patient to continue on their high protein diet to aid in wound  healing.    I have also encouraged her to minimize nicotine use, nicotine slows wound healing.  The patient will return to the wound clinic in 4 weeks to see me again.        30 minutes spent on the date of the encounter doing chart review, history and exam, documentation and further activities per the note      Kojo Osborn MD  09/28/2023   9:58 AM   [facility]  [phone]    This note was electronically signed by Kojo Osborn MD

## 2023-10-24 NOTE — PATIENT INSTRUCTIONS
Wound care supplies were ordered today through [employer] and if you are not receiving your supplies or have a question on your bill please contact Nata Squires at [phone]. Please allow 2-5 business days for delivery of supplies. You may get a call from a [phone] # if there are additional information Jeff needs. It is important to  or return their call. PLEASE NOTE: If you need to return your supplies, you MUST call customer service within 15 days of delivery date.         Wound Care Instructions    EVERY OTHER DAY and as needed, Cleanse your right elbow wound(s) with Normal saline.    Pat Dry with non-sterile gauze    Apply Lotion to the intact skin surrounding your wound and other dry skin locations. Some good lotions include: Remedy Skin Repair Cream, Sarna, Vanicream or Cetaphil    Primary Dressing: Apply endoform into/onto the wounds    Secondary dressing: Cover with mepilex    It is ok to get your wound wet in the bath or shower      If for some reason you are not able to get your dressing(s) changed as outlined above (due to illness, lack of supplies, lack of help) please do the following: remove old, soiled dressings; wash the wounds with saline; pat dry; apply ABD pad or other absorbant pad and secure with rolled gauze; avoid tape directly on your skin; Call the clinic as soon as possible to let us know what the current issues are in receiving wound care [phone].      SEEK MEDICAL CARE IF:  You have an increase in swelling, pain, or redness around the wound.  You have an increase in the amount of pus coming from the wound.  There is a bad smell coming from the wound.  The wound appears to be worsening/enlarging  You have a fever greater than 101.5 F      It is ok to continue current wound care treatment/products for the next 2-3 days until new wound care supplies are ordered and arrive. If longer than this please contact our office at [phone].    If you have a 2 layer or 4 layer  compression wrap on these are safe to have on for ONLY 7 days. If for some reason you are not able to get the wrap(s) changed (due to illness; lack of supplies, lack of help, lack of transportation) please do the following: unwrap the old 2 or 4 layer compression wrap; avoid using scissors as you could cut your skin and cause wounds; use tubular compression when available. Call to reschedule your home care or clinic visit appointment as soon as possible.    Please NOTE: if you are 15 minutes late to your clinic appointment you will have to be rescheduled. Please call our clinic as soon as possible if you know you will not be able to get to your appointment at [phone].    If you fail to show up to 3 scheduled clinic appointments you will be dismissed from our clinic.              We want to hear from you!  In the next few weeks, you should receive a call or email to complete a survey about your visit at [facility]. Please help us improve your appointment experience by letting us know how we did today. We strive to make your experience good and value any ways in which we could do better.      We value your input and suggestions.    Thank you for choosing the [facility]!

## 2023-10-26 ENCOUNTER — OFFICE VISIT (OUTPATIENT)
Dept: VASCULAR SURGERY | Facility: CLINIC | Age: 68
End: 2023-10-26
Attending: FAMILY MEDICINE
Payer: COMMERCIAL

## 2023-10-26 VITALS — SYSTOLIC BLOOD PRESSURE: 118 MMHG | HEART RATE: 88 BPM | OXYGEN SATURATION: 91 % | DIASTOLIC BLOOD PRESSURE: 78 MMHG

## 2023-10-26 DIAGNOSIS — T81.89XD NON-HEALING SURGICAL WOUND, SUBSEQUENT ENCOUNTER: Primary | ICD-10-CM

## 2023-10-26 PROCEDURE — G0463 HOSPITAL OUTPT CLINIC VISIT: HCPCS | Performed by: FAMILY MEDICINE

## 2023-10-26 PROCEDURE — 99214 OFFICE O/P EST MOD 30 MIN: CPT | Performed by: FAMILY MEDICINE

## 2023-10-26 ASSESSMENT — PAIN SCALES - GENERAL: PAINLEVEL: NO PAIN (0)

## 2023-10-26 NOTE — LETTER
"[facility]  [address] [place] [address] [address], [place] [address]  [phone]      Fax [phone]    [facility]           Fax: [phone]            Customer Service: [phone]        Account #: [id number]    Wound Dressing Rx and Order Form  Order Status: refill  Verbal: Anay  Date: 2023     Cynthia Chawla  Gender: female  : 1955  [address]    [facility] [address]  [phone] (home)     Medical Record: [id number]  Primary Care Provider: Em Kaufman      ICD-10-CM    1. Non-healing surgical wound, subsequent encounter  T81.89XD             Insurance Info:  INSURER: Payor: MEDICA / Plan: MEDICA [employer] NON/[employer]NERS / Product Type: Indemnity /   Policy ID#:  [id number]  SECONDARY INSURANCE:    Secondary Policy ID#:  N/A        Physician Info:   Name:  MIKE DALEY     Dept Address/Phones:   [address] [place] [address], [address] [id number] [phone]  Fax: [phone]    Lymphedema circumferential measurements (in cm):       No data to display                  Wound info:  VASC Wound right elbow (Active)   Number of days: 155       VASC Wound right elbow (Active)   Pre Size Length 1.5 10/26/23 1100   Pre Size Width 1.7 10/26/23 1100   Pre Size Depth 0.2 10/26/23 1100   Pre Total Sq cm 2.55 10/26/23 1100   Post Size Length 2 23 0900   Post Size Width 2 23 0900   Post Size Depth 0.2 23 0900   Post Total Sq cm 4 23 0900   Undermined 1.9jm96-4 o'clock 10/26/23 1100   Number of days: 155        Drainage: moderate  Thickness:  full  Duration of Need: 30 DAYS  Days Supply: 30 DAYS  Start Date: 10/26/2023  Starter Kit: Ancillary Kit (saline, gloves, gauze)  Qualifying wound/Debridement: Yes      Dressing Type Brand Size Frequency of change -or- Quantity   Primary Mepilex border adhesive bandage  3\"x3\" EVERY OTHER DAY and as needed     No substitutions preferred. Call [phone]. "         OK to forward to covered supplier.    Electronically Signed Physician:  MIKE DALEY             Date: October 26, 2023

## 2023-10-26 NOTE — PROGRESS NOTES
Wound Clinic Note          Visit date: 10/26/2023       Cheif Complaint:     Cynthia Chawla is a 68 year old female had concerns including right elbow wound .  She has a right elbow wound.      HISTORY OF PRESENT ILLNESS:    Cynthia Chawla reports the ulcer has been present since December 2022.  The wound began after a recent surgery and the incision did not heal normally.  She was accompanied to the wound clinic for initial evaluation by her PCA who provided portions of the history.  She initially had a septic bursitis of the right elbow in December 2022 which self expressed and was ultimately treated with antibiotics, but a wound persisted.  She then sustained a fall in January 2023 landing on the same right elbow causing the wound to open up further.  She was evaluated at a local hospital and was determined to need an operative washout of the right elbow by orthopedic surgery which was performed.  Initially after being discharged from the hospital she followed up with the orthopedic surgeon.  She then was seen at the [facility] wound clinic on March 20, 2023.  She reports she has not seen the orthopedic surgeon since February and does not have another appointment scheduled with the orthopedic surgeon.      Today she is again accompanied to the wound clinic by her PCA and they both provide portions of the interval history.      Since her last clinic visit her PCA has been changing the bandages every other day using endoform and then Hydrofera Blue and a Mepilex bandage.  There is been light to moderate serous drainage from the wound.  There is been no difficulties with the dressing changes.      She has been using elbow immobilizer for tennis elbow.      The pateint denies fevers or chills.  They report the pain from the wound has been 0/10 and has remained about the same recently.      Today the patient reports maintaining a high protein diet and taking protein supplements regularly.        The patient denies a  history of diabetes or chronic steroid use.  Today she reports she has not been smoking recently but does occasionally vape.    The patient has not had any symptoms of infection relating to the wound recently and is not currently on antibiotics.       Problem List:   Past Medical History:   Diagnosis Date    Chronic pain     COPD (chronic obstructive pulmonary disease) (H)     DJD (degenerative joint disease)     Hepatitis     Major depression, recurrent (H)     Sleep apnea     have C-PAP,But don't really use it.              Family Hx: family history includes Asthma in her son; Cardiovascular in her maternal grandfather and mother.       Surgical Hx:   Past Surgical History:   Procedure Laterality Date    APPENDECTOMY      COLONOSCOPY      GYN SURGERY      tubal ligation    HEAD & NECK SURGERY      teeth ext    RECESSION RESECTION (REPAIR STRABISMUS) Right 10/22/2015    Procedure: RECESSION RESECTION (REPAIR STRABISMUS);  Surgeon: Hilario Hills MD;  Location: [facility]          Allergies:    Allergies   Allergen Reactions    Nefazodone     Nsaids      Has GFR <60    Tramadol      Seizures    Trazodone               Medication History:    Current Outpatient Medications   Medication Sig    Acetaminophen (TYLENOL PO) Take 500 mg by mouth every 6 hours as needed for mild pain or fever    albuterol (PROAIR HFA, PROVENTIL HFA, VENTOLIN HFA) 108 (90 BASE) MCG/ACT inhaler Inhale 2 puffs into the lungs every 4 hours as needed for shortness of breath / dyspnea or wheezing    amLODIPine (NORVASC) 5 MG tablet Take 5 mg by mouth daily Take 7.5mg daily    amLODIPine (NORVASC) 5 MG tablet     aspirin (ASA) 81 MG chewable tablet CHEW AND SWALLOW 1 TABLET(81 MG) BY MOUTH DAILY    atorvastatin (LIPITOR) 20 MG tablet     BuPROPion HCl (WELLBUTRIN XL PO) Take 150 mg by mouth daily    calcium carb 1250 mg, 500 mg [place],/vitamin D 200 units (OSCAL WITH D) 500-200 MG-UNIT per tablet Take 1 tablet by mouth 2 times daily (with  meals)    cyclobenzaprine (FLEXERIL) 10 MG tablet     desonide (DESOWEN) 0.05 % ointment Apply topically 2 times daily    diazepam (VALIUM) 5 MG tablet Take one tablet 30 minutes prior lumbar injection due to severe anxiety    ferrous sulfate (FEROSUL) 325 (65 Fe) MG tablet Take 325 mg by mouth    FLUoxetine (PROZAC) 10 MG capsule     Gabapentin (NEURONTIN PO) Take 800 mg by mouth 3 times daily    gabapentin (NEURONTIN) 800 MG tablet     hydrOXYzine (ATARAX) 25 MG tablet TAKE 1 TO 2 TABLETS(25 TO 50 MG) BY MOUTH EVERY 6 HOURS AS NEEDED FOR ANXIETY    ibuprofen (ADVIL/MOTRIN) 600 MG tablet     IBUPROFEN 800 MG OR TABS 1 TABLET  DAILY AS NEEDED    ipratropium - albuterol 0.5 mg/2.5 mg/3 mL (DUONEB) 0.5-2.5 (3) MG/3ML nebulization Take 1 vial by nebulization as needed for shortness of breath or wheezing    melatonin 3 MG tablet Take 3 mg by mouth    Methadone HCl (METHADOSE PO) Take 100 mg by mouth    mometasone-formoterol (DULERA) 200-5 MCG/ACT oral inhaler Inhale 2 puffs into the lungs 2 times daily    Multiple Vitamins-Minerals (THERAPEUTIC-M) TABS Take 1 tablet by mouth daily    naloxone (NARCAN) 4 MG/0.1ML nasal spray Gently insert the tip of the nozzle into either nostril. Press the plunger firmly to give dose; remove device from nose. If no reaction in 2-3 minutes, give a second dose. Call 911 to prevent rebound overdose.    nitroGLYcerin (NITROSTAT) 0.4 MG sublingual tablet DISSOLVE 1 TABLET UNDER THE TONGUE AS NEEDED FOR CHEST PAIN    VITAMIN D, CHOLECALCIFEROL, PO Take 1,000 Units by mouth daily    XANAX 1 MG OR TABS      No current facility-administered medications for this visit.         Tobacco History:  reports that she has been smoking cigarettes and vaping device. She has been smoking an average of 1 pack per day. She has never used smokeless tobacco.       REVIEW OF SYMPTOMS:   The review of systems was negative except as noted in the HPI.           PHYSICAL EXAMINATION:     /78   Pulse 88    LMP 04/20/2008   SpO2 91%            GENERAL: The patient overall appears well and is no acute distress.   HEAD: normocephalic   EYES: Sclera and conjunctiva clear   NECK: no obvious masses   LUNGS: breathing is unlabored.   EXTREMITIES: No clubbing, cyanosis or edema   SKIN: No rashes or other abnormalities except as noted under the Wound section below.   NEUROLOGICAL: normal motor and sensory function       WOUND/ulcer: The wound appears healthy with no sign of infection.   Wound bed: granulation tissue  Periwound: healthy intact skin  Today the surface area measurements are less and the undermining measurements are slightly less.      Also see below for wound details:     Circumferential volume measures:             No data to display                Ulceration(s)/Wound(s):   Please see the media tab under the chart review for pictures of the wounds.  Nursing staff removed dressings and cleansed wound.    VASC Wound right elbow (Active)   Pre Size Length 1.5 10/26/23 1100   Pre Size Width 1.7 10/26/23 1100   Pre Size Depth 0.2 10/26/23 1100                 No results for input(s): HGBA1C, A1C, EAG in the last 41312 hours.    Invalid input(s): [id number]       Recent Labs   Lab Test 07/02/21  0510   ALBUMIN 3.5              No debridement performed today.                  ASSESSMENT:   This is a 68 year old female with a right elbow surgical wound          PLAN:   They can continue using endoform, Hydrofera Blue and a Mepilex bandage changed every other day.  I have encouraged her to continue to use the sling to minimize movement of the elbow joint.    I have encouraged her to be vigilant to make sure there is no pressure or friction applied to this area throughout the day.    I have encouraged the patient to continue on their high protein diet to aid in wound healing.    I have also encouraged her to minimize nicotine use, nicotine slows wound healing.  The patient will return to the wound clinic in 4 weeks to  see me again.        30 minutes spent on the date of the encounter doing chart review, history and exam, documentation and further activities per the note      Kojo Osborn MD  10/26/2023   11:28 AM   [facility]  [phone]    This note was electronically signed by Kojo Osborn MD

## 2023-11-21 NOTE — PATIENT INSTRUCTIONS
Wound care supplies were ordered today through [employer] and if you are not receiving your supplies or have a question on your bill please contact Nata Squires at [phone]. Please allow 2-5 business days for delivery of supplies. You may get a call from a [phone] if there are additional information Jeff needs. It is important to  or return their call. PLEASE NOTE: If you need to return your supplies, you MUST call customer service within 15 days of delivery date.         Wound Care Instructions    EVERY OTHER DAY and as needed, Cleanse your right elbow wound(s) with Normal saline.    Pat Dry with non-sterile gauze    Apply Lotion to the intact skin surrounding your wound and other dry skin locations. Some good lotions include: Remedy Skin Repair Cream, Sarna, Vanicream or Cetaphil    Primary Dressing: Apply endoform into/onto the wounds    Secondary dressing: Cover with mepilex    It is ok to get your wound wet in the bath or shower      If for some reason you are not able to get your dressing(s) changed as outlined above (due to illness, lack of supplies, lack of help) please do the following: remove old, soiled dressings; wash the wounds with saline; pat dry; apply ABD pad or other absorbant pad and secure with rolled gauze; avoid tape directly on your skin; Call the clinic as soon as possible to let us know what the current issues are in receiving wound care [phone].      SEEK MEDICAL CARE IF:  You have an increase in swelling, pain, or redness around the wound.  You have an increase in the amount of pus coming from the wound.  There is a bad smell coming from the wound.  The wound appears to be worsening/enlarging  You have a fever greater than 101.5 F      It is ok to continue current wound care treatment/products for the next 2-3 days until new wound care supplies are ordered and arrive. If longer than this please contact our office at [phone].    If you have a 2 layer or 4 layer  compression wrap on these are safe to have on for ONLY 7 days. If for some reason you are not able to get the wrap(s) changed (due to illness; lack of supplies, lack of help, lack of transportation) please do the following: unwrap the old 2 or 4 layer compression wrap; avoid using scissors as you could cut your skin and cause wounds; use tubular compression when available. Call to reschedule your home care or clinic visit appointment as soon as possible.    Please NOTE: if you are 15 minutes late to your clinic appointment you will have to be rescheduled. Please call our clinic as soon as possible if you know you will not be able to get to your appointment at [phone].    If you fail to show up to 3 scheduled clinic appointments you will be dismissed from our clinic.              We want to hear from you!  In the next few weeks, you should receive a call or email to complete a survey about your visit at [facility]. Please help us improve your appointment experience by letting us know how we did today. We strive to make your experience good and value any ways in which we could do better.      We value your input and suggestions.    Thank you for choosing the [facility]!

## 2023-11-30 ENCOUNTER — OFFICE VISIT (OUTPATIENT)
Dept: VASCULAR SURGERY | Facility: CLINIC | Age: 68
End: 2023-11-30
Attending: FAMILY MEDICINE
Payer: COMMERCIAL

## 2023-11-30 VITALS — SYSTOLIC BLOOD PRESSURE: 146 MMHG | OXYGEN SATURATION: 96 % | HEART RATE: 80 BPM | DIASTOLIC BLOOD PRESSURE: 86 MMHG

## 2023-11-30 DIAGNOSIS — T81.89XD NON-HEALING SURGICAL WOUND, SUBSEQUENT ENCOUNTER: Primary | ICD-10-CM

## 2023-11-30 PROCEDURE — G0463 HOSPITAL OUTPT CLINIC VISIT: HCPCS | Performed by: FAMILY MEDICINE

## 2023-11-30 PROCEDURE — 99213 OFFICE O/P EST LOW 20 MIN: CPT | Performed by: FAMILY MEDICINE

## 2023-11-30 ASSESSMENT — PAIN SCALES - GENERAL: PAINLEVEL: MILD PAIN (3)

## 2023-11-30 NOTE — PROGRESS NOTES
Wound Clinic Note          Visit date: 11/30/2023       Cheif Complaint:     Cynthia Chawla is a 68 year old female had concerns including Wound Check.  She has a right elbow wound.      HISTORY OF PRESENT ILLNESS:    Cynthia Chawla reports the ulcer has been present since December 2022.  The wound began after a recent surgery and the incision did not heal normally.  She was accompanied to the wound clinic for initial evaluation by her PCA who provided portions of the history.  She initially had a septic bursitis of the right elbow in December 2022 which self expressed and was ultimately treated with antibiotics, but a wound persisted.  She then sustained a fall in January 2023 landing on the same right elbow causing the wound to open up further.  She was evaluated at a local hospital and was determined to need an operative washout of the right elbow by orthopedic surgery which was performed.  Initially after being discharged from the hospital she followed up with the orthopedic surgeon.  She then was seen at the [facility] wound clinic on March 20, 2023.  She reports she has not seen the orthopedic surgeon since February 2023 and does not have another appointment scheduled with the orthopedic surgeon.      Today she is again accompanied to the wound clinic by her PCA and they both provide portions of the interval history.      Since her last clinic visit the patient has been changing the bandages every other day using endoform and then Hydrofera Blue and a Mepilex bandage.  There is been light to moderate serous drainage from the wound.  There is been no difficulties with the dressing changes.      She has been using elbow immobilizer for tennis elbow.      The pateint denies fevers or chills.  They report the pain from the wound has been 0/10 and has remained about the same recently.      Today the patient reports maintaining a high protein diet and taking protein supplements regularly.        The patient denies  a history of diabetes or chronic steroid use.  Today she reports she has not been smoking recently but does occasionally vape.    The patient has not had any symptoms of infection relating to the wound recently and is not currently on antibiotics.       Problem List:   Past Medical History:   Diagnosis Date    Chronic pain     COPD (chronic obstructive pulmonary disease) (H)     DJD (degenerative joint disease)     Hepatitis     Major depression, recurrent (H24)     Sleep apnea     have C-PAP,But don't really use it.              Family Hx: family history includes Asthma in her son; Cardiovascular in her maternal grandfather and mother.       Surgical Hx:   Past Surgical History:   Procedure Laterality Date    APPENDECTOMY      COLONOSCOPY      GYN SURGERY      tubal ligation    HEAD & NECK SURGERY      teeth ext    RECESSION RESECTION (REPAIR STRABISMUS) Right 10/22/2015    Procedure: RECESSION RESECTION (REPAIR STRABISMUS);  Surgeon: Hilario Hills MD;  Location: [facility]          Allergies:    Allergies   Allergen Reactions    Nefazodone     Nsaids      Has GFR <60    Tramadol      Seizures    Trazodone               Medication History:    Current Outpatient Medications   Medication Sig    Acetaminophen (TYLENOL PO) Take 500 mg by mouth every 6 hours as needed for mild pain or fever    albuterol (PROAIR HFA, PROVENTIL HFA, VENTOLIN HFA) 108 (90 BASE) MCG/ACT inhaler Inhale 2 puffs into the lungs every 4 hours as needed for shortness of breath / dyspnea or wheezing    amLODIPine (NORVASC) 5 MG tablet Take 5 mg by mouth daily Take 7.5mg daily    amLODIPine (NORVASC) 5 MG tablet     aspirin (ASA) 81 MG chewable tablet CHEW AND SWALLOW 1 TABLET(81 MG) BY MOUTH DAILY    atorvastatin (LIPITOR) 20 MG tablet     BuPROPion HCl (WELLBUTRIN XL PO) Take 150 mg by mouth daily    calcium carb 1250 mg, 500 mg [place],/vitamin D 200 units (OSCAL WITH D) 500-200 MG-UNIT per tablet Take 1 tablet by mouth 2 times daily (with  meals)    cyclobenzaprine (FLEXERIL) 10 MG tablet     desonide (DESOWEN) 0.05 % ointment Apply topically 2 times daily    diazepam (VALIUM) 5 MG tablet Take one tablet 30 minutes prior lumbar injection due to severe anxiety    ferrous sulfate (FEROSUL) 325 (65 Fe) MG tablet Take 325 mg by mouth    FLUoxetine (PROZAC) 10 MG capsule     Gabapentin (NEURONTIN PO) Take 800 mg by mouth 3 times daily    gabapentin (NEURONTIN) 800 MG tablet     hydrOXYzine (ATARAX) 25 MG tablet TAKE 1 TO 2 TABLETS(25 TO 50 MG) BY MOUTH EVERY 6 HOURS AS NEEDED FOR ANXIETY    ibuprofen (ADVIL/MOTRIN) 600 MG tablet     IBUPROFEN 800 MG OR TABS 1 TABLET  DAILY AS NEEDED    ipratropium - albuterol 0.5 mg/2.5 mg/3 mL (DUONEB) 0.5-2.5 (3) MG/3ML nebulization Take 1 vial by nebulization as needed for shortness of breath or wheezing    melatonin 3 MG tablet Take 3 mg by mouth    Methadone HCl (METHADOSE PO) Take 100 mg by mouth    mometasone-formoterol (DULERA) 200-5 MCG/ACT oral inhaler Inhale 2 puffs into the lungs 2 times daily    Multiple Vitamins-Minerals (THERAPEUTIC-M) TABS Take 1 tablet by mouth daily    naloxone (NARCAN) 4 MG/0.1ML nasal spray Gently insert the tip of the nozzle into either nostril. Press the plunger firmly to give dose; remove device from nose. If no reaction in 2-3 minutes, give a second dose. Call 911 to prevent rebound overdose.    nitroGLYcerin (NITROSTAT) 0.4 MG sublingual tablet DISSOLVE 1 TABLET UNDER THE TONGUE AS NEEDED FOR CHEST PAIN    VITAMIN D, CHOLECALCIFEROL, PO Take 1,000 Units by mouth daily    XANAX 1 MG OR TABS      No current facility-administered medications for this visit.         Tobacco History:  reports that she has been smoking cigarettes and vaping device. She has been smoking an average of 1 pack per day. She has never used smokeless tobacco.       REVIEW OF SYMPTOMS:   The review of systems was negative except as noted in the HPI.           PHYSICAL EXAMINATION:     BP (!) 146/86   Pulse 80    LMP 04/20/2008   SpO2 96%            GENERAL: The patient overall appears well and is no acute distress.   HEAD: normocephalic   EYES: Sclera and conjunctiva clear   NECK: no obvious masses   LUNGS: breathing is unlabored.   EXTREMITIES: No clubbing, cyanosis or edema   SKIN: No rashes or other abnormalities except as noted under the Wound section below.   NEUROLOGICAL: normal motor and sensory function       WOUND/ulcer: The wound appears healthy with no sign of infection.   Wound bed: granulation tissue  Periwound: healthy intact skin  Today the surface area measurements are again less and the undermining measurements are slightly less.      Also see below for wound details:     Circumferential volume measures:             No data to display                Ulceration(s)/Wound(s):   Please see the media tab under the chart review for pictures of the wounds.  Nursing staff removed dressings and cleansed wound.    VASC Wound right elbow (Active)   Pre Size Length 1.5 11/30/23 1100   Pre Size Width 2 11/30/23 1100   Pre Size Depth 0.2 11/30/23 1100   Pre Total Sq cm 3 11/30/23 1100   Undermined 1cm @10-1 oclock 11/30/23 1100                   No results for input(s): HGBA1C, A1C, EAG in the last 28624 hours.    Invalid input(s): [id number]       Recent Labs   Lab Test 07/02/21  0510   ALBUMIN 3.5              No debridement performed today.                  ASSESSMENT:   This is a 68 year old female with a right elbow surgical wound          PLAN:   They can continue using endoform, Hydrofera Blue and a Mepilex bandage changed every other day.  I have encouraged her to continue to use the sling to minimize movement of the elbow joint.    I have encouraged her to be vigilant to make sure there is no pressure or friction applied to this area throughout the day.    I have encouraged the patient to continue on their high protein diet to aid in wound healing.    I have also encouraged her to minimize nicotine use,  nicotine slows wound healing.  The patient will return to the wound clinic in 4 weeks to see me again.        20 minutes spent on the date of the encounter doing chart review, history and exam, documentation and further activities per the note, this time excludes any procedure time      Kojo Osborn MD  11/30/2023   11:34 AM   [facility]  [phone]    This note was electronically signed by Kojo Osborn MD

## 2023-11-30 NOTE — LETTER
"[facility]  [address] [place] [address] [address], [place] [address]  [phone]      Fax [phone]    [facility]           Fax: [phone]            Customer Service: [phone]        Account #: [id number]    Wound Dressing Rx and Order Form  Order Status: refill  Verbal: Anay  Date: 2023     Cynthia Chawla  Gender: female  : 1955  [address]    [facility] [address]  [phone] (home)     Medical Record: [id number]  Primary Care Provider: Em Kaufman      ICD-10-CM    1. Non-healing surgical wound, subsequent encounter  T81.89XD             Insurance Info:  INSURER: Payor: MEDICA / Plan: MEDICA [employer] / Product Type: Indemnity /   Policy ID#:  [id number]  SECONDARY INSURANCE:    Secondary Policy ID#:  N/A        Physician Info:   Name:  MIKE DALEY     Dept Address/Phones:   [address] [place] [address] [address] [id number] [phone]  Fax: [phone]    Lymphedema circumferential measurements (in cm):       No data to display                  Wound info:  VASC Wound right elbow (Active)   Number of days: 190       VASC Wound right elbow (Active)   Pre Size Length 1.5 23 1100   Pre Size Width 2 23 1100   Pre Size Depth 0.2 23 1100   Pre Total Sq cm 3 23 1100   Post Size Length 2 23 0900   Post Size Width 2 23 0900   Post Size Depth 0.2 23 0900   Post Total Sq cm 4 23 0900   Undermined 1cm @10-1 oclock 23 1100   Number of days: 190        Drainage: moderate  Thickness:  full  Duration of Need: 30 DAYS  Days Supply: 30 DAYS  Start Date: 2023  Starter Kit: Ancillary Kit (saline, gloves, gauze)  Qualifying wound/Debridement: Yes      Dressing Type Brand Size Frequency of change -or- Quantity   Primary endoform  2\"x2\" 3 TIMES PER WEEK and as needed    Mepilex border adhesive bandage  4\"x4\" 3 TIMES PER WEEK and as needed     No " substitutions preferred. Call [phone].         OK to forward to covered supplier.    Electronically Signed Physician:  MIKE DALEY             Date: November 30, 2023

## 2023-12-19 NOTE — PATIENT INSTRUCTIONS
Wound care supplies were ordered today through [employer] and if you are not receiving your supplies or have a question on your bill please contact Nata Squires at [phone]. Please allow 2-5 business days for delivery of supplies. You may get a call from [phone] if there are additional information Jeff needs. It is important to  or return their call. PLEASE NOTE: If you need to return your supplies, you MUST call customer service within 15 days of delivery date.         Wound Care Instructions    EVERY OTHER DAY and as needed, Cleanse your right elbow wound(s) with Normal saline.    Pat Dry with non-sterile gauze    Apply Lotion to the intact skin surrounding your wound and other dry skin locations. Some good lotions include: Remedy Skin Repair Cream, Sarna, Vanicream or Cetaphil    Primary Dressing: Apply endoform into/onto the wounds    Secondary dressing: Cover with mepilex    It is ok to get your wound wet in the bath or shower      If for some reason you are not able to get your dressing(s) changed as outlined above (due to illness, lack of supplies, lack of help) please do the following: remove old, soiled dressings; wash the wounds with saline; pat dry; apply ABD pad or other absorbant pad and secure with rolled gauze; avoid tape directly on your skin; Call the clinic as soon as possible to let us know what the current issues are in receiving wound care [phone].      SEEK MEDICAL CARE IF:  You have an increase in swelling, pain, or redness around the wound.  You have an increase in the amount of pus coming from the wound.  There is a bad smell coming from the wound.  The wound appears to be worsening/enlarging  You have a fever greater than 101.5 F      It is ok to continue current wound care treatment/products for the next 2-3 days until new wound care supplies are ordered and arrive. If longer than this please contact our office at [phone].    If you have a 2 layer or 4 layer  compression wrap on these are safe to have on for ONLY 7 days. If for some reason you are not able to get the wrap(s) changed (due to illness; lack of supplies, lack of help, lack of transportation) please do the following: unwrap the old 2 or 4 layer compression wrap; avoid using scissors as you could cut your skin and cause wounds; use tubular compression when available. Call to reschedule your home care or clinic visit appointment as soon as possible.    Please NOTE: if you are 15 minutes late to your clinic appointment you will have to be rescheduled. Please call our clinic as soon as possible if you know you will not be able to get to your appointment at [phone].    If you fail to show up to 3 scheduled clinic appointments you will be dismissed from our clinic.              We want to hear from you!  In the next few weeks, you should receive a call or email to complete a survey about your visit at [facility]. Please help us improve your appointment experience by letting us know how we did today. We strive to make your experience good and value any ways in which we could do better.      We value your input and suggestions.    Thank you for choosing the [facility]!

## 2023-12-28 ENCOUNTER — OFFICE VISIT (OUTPATIENT)
Dept: VASCULAR SURGERY | Facility: CLINIC | Age: 68
End: 2023-12-28
Attending: FAMILY MEDICINE
Payer: COMMERCIAL

## 2023-12-28 VITALS
SYSTOLIC BLOOD PRESSURE: 128 MMHG | OXYGEN SATURATION: 92 % | HEART RATE: 93 BPM | DIASTOLIC BLOOD PRESSURE: 79 MMHG | TEMPERATURE: 98.2 F

## 2023-12-28 DIAGNOSIS — T81.89XD NON-HEALING SURGICAL WOUND, SUBSEQUENT ENCOUNTER: Primary | ICD-10-CM

## 2023-12-28 PROCEDURE — 99213 OFFICE O/P EST LOW 20 MIN: CPT | Performed by: FAMILY MEDICINE

## 2023-12-28 PROCEDURE — G0463 HOSPITAL OUTPT CLINIC VISIT: HCPCS | Performed by: FAMILY MEDICINE

## 2023-12-28 ASSESSMENT — PAIN SCALES - GENERAL: PAINLEVEL: MILD PAIN (3)

## 2023-12-28 NOTE — LETTER
"  2023             [facility]             Wound Dressing Rx and Order Form             Order Status:New Order             Verbal: [facility]   Account # [id number]  Fax: [phone]  Customer Service: [phone]          Patient Info:  Name: Cynthia Chawla  : 1955  Address:   [address] [place], [place] [address] [address]  Phone: [phone]      Insurance Info:  PRIMARY INSURANCE: Payor: MEDICA / Plan: MEDICA D[employer] SOLUTIONS [employer] NON/FV PARTNERS / Product Type: Indemnity /   Primary Policy ID#: [id number]  SECONDARY INSURANCE:  N/A   Secondary Policy ID#: N/A    Physician Info:   Name:  Kojo Osborn MD, MD   Dept Address/Phones:   [facility] [address] [address] [phone]  Dept: [phone]  Fax: [phone]    Wound info:  Encounter Diagnosis   Name Primary?    Non-healing surgical wound, subsequent encounter Yes     VASC Wound right elbow (Active)       VASC Wound right elbow (Active)   Pre Size Length 1.8 23 1337   Pre Size Width 1.3 23 1337   Pre Size Depth 0.2 23 1337   Pre Total Sq cm 2.34 23 1337   Post Size Length 2 23 0900   Post Size Width 2 23 0900   Post Size Depth 0.2 23 0900   Post Total Sq cm 4 23 0900   Undermined 1.5 cm @ 10-1 o'clock 23 1337     Drainage: moderate  Thickness:  Full  Duration of Need: 30  Days Supply: 30  Start Date: 23  Starter Kit: Ancillary Kit (saline, gloves, gauze)  Qualifying wound/Debridement: Yes      Dressing Type Brand Size Frequency of change -or- Quantity   Primary endoform   2\"x2\" 3 TIMES PER WEEK and as needed     Mepilex border adhesive bandage   4\"x4\" 3 TIMES PER WEEK and as needed       No substitutions preferred. Call [phone].    OK to forward to covered supplier.    Electronically Signed Physician: Kojo Osborn MD, MD Date: 2023        "

## 2023-12-28 NOTE — PROGRESS NOTES
Wound Clinic Note          Visit date: 12/28/2023       Cheif Complaint:     Cynthia Chawla is a 68 year old female had concerns including Wound Check (Right elbow wound. Endoform, hydrofera and meplix).  She has a right elbow wound.      HISTORY OF PRESENT ILLNESS:    Cynthia Chawla reports the ulcer has been present since December 2022.  The wound began after a recent surgery and the incision did not heal normally.  She was accompanied to the wound clinic for initial evaluation by her PCA who provided portions of the history.  She initially had a septic bursitis of the right elbow in December 2022 which self expressed and was ultimately treated with antibiotics, but a wound persisted.  She then sustained a fall in January 2023 landing on the same right elbow causing the wound to open up further.  She was evaluated at a local hospital and was determined to need an operative washout of the right elbow by orthopedic surgery which was performed.  Initially after being discharged from the hospital she followed up with the orthopedic surgeon.  She then was seen at the [facility] wound clinic on March 20, 2023.  She reports she has not seen the orthopedic surgeon since February 2023 and does not have another appointment scheduled with the orthopedic surgeon.      Today she is again accompanied to the wound clinic by her PCA and they both provide portions of the interval history.      Since her last clinic visit the patient has been changing the bandages every other day using endoform and then Hydrofera Blue and a Mepilex bandage.  There is been light to moderate serous drainage from the wound.  There is been no difficulties with the dressing changes.      She has been using elbow immobilizer for tennis elbow.      The pateint denies fevers or chills.  They report the pain from the wound has been 0/10 and has remained about the same recently.      Today the patient reports maintaining a high protein diet and taking  protein supplements regularly.        The patient denies a history of diabetes or chronic steroid use.  Today she reports she has not been smoking recently but does occasionally vape.    The patient has not had any symptoms of infection relating to the wound recently and is not currently on antibiotics.       Problem List:   Past Medical History:   Diagnosis Date    Chronic pain     COPD (chronic obstructive pulmonary disease) (H)     DJD (degenerative joint disease)     Hepatitis     Major depression, recurrent (H24)     Sleep apnea     have C-PAP,But don't really use it.              Family Hx: family history includes Asthma in her son; Cardiovascular in her maternal grandfather and mother.       Surgical Hx:   Past Surgical History:   Procedure Laterality Date    APPENDECTOMY      COLONOSCOPY      GYN SURGERY      tubal ligation    HEAD & NECK SURGERY      teeth ext    RECESSION RESECTION (REPAIR STRABISMUS) Right 10/22/2015    Procedure: RECESSION RESECTION (REPAIR STRABISMUS);  Surgeon: Hilario Hills MD;  Location: [facility]          Allergies:    Allergies   Allergen Reactions    Nefazodone     Nsaids      Has GFR <60    Tramadol      Seizures    Trazodone               Medication History:    Current Outpatient Medications   Medication Sig    Acetaminophen (TYLENOL PO) Take 500 mg by mouth every 6 hours as needed for mild pain or fever    albuterol (PROAIR HFA, PROVENTIL HFA, VENTOLIN HFA) 108 (90 BASE) MCG/ACT inhaler Inhale 2 puffs into the lungs every 4 hours as needed for shortness of breath / dyspnea or wheezing    amLODIPine (NORVASC) 5 MG tablet Take 5 mg by mouth daily Take 7.5mg daily    amLODIPine (NORVASC) 5 MG tablet     aspirin (ASA) 81 MG chewable tablet CHEW AND SWALLOW 1 TABLET(81 MG) BY MOUTH DAILY    atorvastatin (LIPITOR) 20 MG tablet     BuPROPion HCl (WELLBUTRIN XL PO) Take 150 mg by mouth daily    calcium carb 1250 mg, 500 mg [place],/vitamin D 200 units (OSCAL WITH D) 500-200  MG-UNIT per tablet Take 1 tablet by mouth 2 times daily (with meals)    cyclobenzaprine (FLEXERIL) 10 MG tablet     desonide (DESOWEN) 0.05 % ointment Apply topically 2 times daily    diazepam (VALIUM) 5 MG tablet Take one tablet 30 minutes prior lumbar injection due to severe anxiety    ferrous sulfate (FEROSUL) 325 (65 Fe) MG tablet Take 325 mg by mouth    FLUoxetine (PROZAC) 10 MG capsule     Gabapentin (NEURONTIN PO) Take 800 mg by mouth 3 times daily    gabapentin (NEURONTIN) 800 MG tablet     hydrOXYzine (ATARAX) 25 MG tablet TAKE 1 TO 2 TABLETS(25 TO 50 MG) BY MOUTH EVERY 6 HOURS AS NEEDED FOR ANXIETY    ibuprofen (ADVIL/MOTRIN) 600 MG tablet     IBUPROFEN 800 MG OR TABS 1 TABLET  DAILY AS NEEDED    ipratropium - albuterol 0.5 mg/2.5 mg/3 mL (DUONEB) 0.5-2.5 (3) MG/3ML nebulization Take 1 vial by nebulization as needed for shortness of breath or wheezing    melatonin 3 MG tablet Take 3 mg by mouth    Methadone HCl (METHADOSE PO) Take 100 mg by mouth    mometasone-formoterol (DULERA) 200-5 MCG/ACT oral inhaler Inhale 2 puffs into the lungs 2 times daily    Multiple Vitamins-Minerals (THERAPEUTIC-M) TABS Take 1 tablet by mouth daily    naloxone (NARCAN) 4 MG/0.1ML nasal spray Gently insert the tip of the nozzle into either nostril. Press the plunger firmly to give dose; remove device from nose. If no reaction in 2-3 minutes, give a second dose. Call 911 to prevent rebound overdose.    nitroGLYcerin (NITROSTAT) 0.4 MG sublingual tablet DISSOLVE 1 TABLET UNDER THE TONGUE AS NEEDED FOR CHEST PAIN    VITAMIN D, CHOLECALCIFEROL, PO Take 1,000 Units by mouth daily    XANAX 1 MG OR TABS      No current facility-administered medications for this visit.         Tobacco History:  reports that she has been smoking cigarettes and vaping device. She has been smoking an average of 1 pack per day. She has never used smokeless tobacco.       REVIEW OF SYMPTOMS:   The review of systems was negative except as noted in the HPI.            PHYSICAL EXAMINATION:     /79   Pulse 93   Temp 98.2  F (36.8  C)   LMP 04/20/2008   SpO2 92%            GENERAL: The patient overall appears well and is no acute distress.   HEAD: normocephalic   EYES: Sclera and conjunctiva clear   NECK: no obvious masses   LUNGS: breathing is unlabored.   EXTREMITIES: No clubbing, cyanosis or edema   SKIN: No rashes or other abnormalities except as noted under the Wound section below.   NEUROLOGICAL: normal motor and sensory function       WOUND/ulcer: The wound appears healthy with no sign of infection.   Wound bed: granulation tissue  Periwound: healthy intact skin  Today I got an undermining measurement of 0.6 which is quite a bit less compared with her last clinic visit, the wound is healing well.      Also see below for wound details:     Circumferential volume measures:             No data to display                Ulceration(s)/Wound(s):   Please see the media tab under the chart review for pictures of the wounds.  Nursing staff removed dressings and cleansed wound.    VASC Wound right elbow (Active)   Pre Size Length 1.8 12/28/23 1337   Pre Size Width 1.3 12/28/23 1337   Pre Size Depth 0.2 12/28/23 1337   Pre Total Sq cm 2.34 12/28/23 1337   Undermined 1.5 cm @ 10-1 o'clock 12/28/23 1337                     No results for input(s): HGBA1C, A1C, EAG in the last 46818 hours.    Invalid input(s): [id number]       Recent Labs   Lab Test 07/02/21  0510   ALBUMIN 3.5              No debridement performed today.                  ASSESSMENT:   This is a 68 year old female with a right elbow surgical wound          PLAN:   They can continue using endoform, Hydrofera Blue and a Mepilex bandage changed every other day.  I have encouraged her to continue to use the sling to minimize movement of the elbow joint.    I have encouraged her to be vigilant to make sure there is no pressure or friction applied to this area throughout the day.    I have encouraged the  patient to continue on their high protein diet to aid in wound healing.    I have also encouraged her to minimize nicotine use, nicotine slows wound healing.  The patient will return to the wound clinic in 4 weeks to see me again.        20 minutes spent on the date of the encounter doing chart review, history and exam, documentation and further activities per the note, this time excludes any procedure time      Kojo Osborn MD  12/28/2023   1:56 PM   [facility]  [phone]    This note was electronically signed by Kojo Osborn MD

## 2024-01-18 NOTE — PATIENT INSTRUCTIONS
Wound care supplies were ordered today through [employer] and if you are not receiving your supplies or have a question on your bill please contact Nata Squires at [phone]. Please allow 2-5 business days for delivery of supplies. You may get a call from [phone] if there are additional information Jeff needs. It is important to  or return their call. PLEASE NOTE: If you need to return your supplies, you MUST call customer service within 15 days of delivery date.         Wound Care Instructions    EVERY OTHER DAY and as needed, Cleanse your right elbow wound(s) with Normal saline.    Pat Dry with non-sterile gauze    Apply Lotion to the intact skin surrounding your wound and other dry skin locations. Some good lotions include: Remedy Skin Repair Cream, Sarna, Vanicream or Cetaphil    Primary Dressing: Apply endoform and adaptic into/onto the wounds    Secondary dressing: ABD pad     It is ok to get your wound wet in the bath or shower      If for some reason you are not able to get your dressing(s) changed as outlined above (due to illness, lack of supplies, lack of help) please do the following: remove old, soiled dressings; wash the wounds with saline; pat dry; apply ABD pad or other absorbant pad and secure with rolled gauze; avoid tape directly on your skin; Call the clinic as soon as possible to let us know what the current issues are in receiving wound care [phone].      SEEK MEDICAL CARE IF:  You have an increase in swelling, pain, or redness around the wound.  You have an increase in the amount of pus coming from the wound.  There is a bad smell coming from the wound.  The wound appears to be worsening/enlarging  You have a fever greater than 101.5 F      It is ok to continue current wound care treatment/products for the next 2-3 days until new wound care supplies are ordered and arrive. If longer than this please contact our office at [phone].    If you have a 2 layer or 4 layer  compression wrap on these are safe to have on for ONLY 7 days. If for some reason you are not able to get the wrap(s) changed (due to illness; lack of supplies, lack of help, lack of transportation) please do the following: unwrap the old 2 or 4 layer compression wrap; avoid using scissors as you could cut your skin and cause wounds; use tubular compression when available. Call to reschedule your home care or clinic visit appointment as soon as possible.    Please NOTE: if you are 15 minutes late to your clinic appointment you will have to be rescheduled. Please call our clinic as soon as possible if you know you will not be able to get to your appointment at [phone].    If you fail to show up to 3 scheduled clinic appointments you will be dismissed from our clinic.              We want to hear from you!  In the next few weeks, you should receive a call or email to complete a survey about your visit at [facility]. Please help us improve your appointment experience by letting us know how we did today. We strive to make your experience good and value any ways in which we could do better.      We value your input and suggestions.    Thank you for choosing the [facility]!

## 2024-01-25 ENCOUNTER — OFFICE VISIT (OUTPATIENT)
Dept: VASCULAR SURGERY | Facility: CLINIC | Age: 69
End: 2024-01-25
Attending: FAMILY MEDICINE
Payer: COMMERCIAL

## 2024-01-25 VITALS
HEART RATE: 70 BPM | TEMPERATURE: 98.3 F | RESPIRATION RATE: 16 BRPM | DIASTOLIC BLOOD PRESSURE: 62 MMHG | SYSTOLIC BLOOD PRESSURE: 102 MMHG | OXYGEN SATURATION: 94 %

## 2024-01-25 DIAGNOSIS — T81.89XD NON-HEALING SURGICAL WOUND, SUBSEQUENT ENCOUNTER: Primary | ICD-10-CM

## 2024-01-25 PROCEDURE — 99214 OFFICE O/P EST MOD 30 MIN: CPT | Performed by: FAMILY MEDICINE

## 2024-01-25 PROCEDURE — G0463 HOSPITAL OUTPT CLINIC VISIT: HCPCS | Performed by: FAMILY MEDICINE

## 2024-01-25 ASSESSMENT — PAIN SCALES - GENERAL: PAINLEVEL: NO PAIN (0)

## 2024-01-25 NOTE — LETTER
"[facility]  [address] [place] [address] [address]  [place], [place] [address]  [phone]      Fax [phone]    [facility]           Fax: [phone]            Customer Service: [phone]        Account #: [id number]    Wound Dressing Rx and Order Form  Order Status: New  Verbal: Emmy   Date: 2024     Cynthia Chawla  Gender: female  : 1955  [address]    [facility] [address]  [phone] (home)     Medical Record: [id number]  Primary Care Provider: Em Kaufman      ICD-10-CM    1. Non-healing surgical wound, subsequent encounter  T81.89XD Wound care            Insurance Info:  INSURER: Payor: MEDICA / Plan: MEDICA D[employer]UT[employer] NON/FV PARTNERS / Product Type: Indemnity /   Policy ID#:  [id number]  SECONDARY INSURANCE:    Secondary Policy ID#:  N/A        Physician Info:   Name:  MIKE DALEY     Dept Address/Phones:   [address] [place] [address], [address] [id number] [phone]  Fax: [phone]    Lymphedema circumferential measurements (in cm):       No data to display                  Wound info:  VASC Wound right elbow (Active)   Number of days: 246       VASC Wound right elbow (Active)   Pre Size Length 1.4 24 1300   Pre Size Width 1.1 24 1300   Pre Size Depth 0.2 24 1300   Pre Total Sq cm 2.34 23 1337   Post Size Length 2 23 0900   Post Size Width 2 23 0900   Post Size Depth 0.2 23 0900   Post Total Sq cm 4 23 0900   Undermined 1cm at 10-1 o clock 24 1300   Number of days: 246        Drainage: moderate  Thickness:  full  Duration of Need: 30 DAYS  Days Supply: 30 DAYS  Start Date: 2024  Starter Kit, Ancillary Kit (saline, gloves, gauze): Yes   Qualifying wound/Debridement: Yes     NO SUBSTITUTIONS. Call [phone].      Dressing Type Brand Size Frequency of change  Quantity   Primary Adaptic     3\"x 3\" EVERY OTHER DAY and as needed    Secondary ABD pad  " "5\" x 9\" EVERY OTHER DAY and as needed      NO SUBSTITUTIONS. Call [phone] with questions.       OK to forward to covered supplier.    Electronically Signed Physician:  MIKE DALEY             Date: January 25, 2024    "

## 2024-01-25 NOTE — PROGRESS NOTES
Wound Clinic Note          Visit date: 01/25/2024       Cheif Complaint:     Cynthia Chawla is a 68 year old female had concerns including rt elbow.  She has a right elbow wound.      HISTORY OF PRESENT ILLNESS:    Cynthia Chawla reports the ulcer has been present since December 2022.  The wound began after a recent surgery and the incision did not heal normally.  She was accompanied to the wound clinic for initial evaluation by her PCA who provided portions of the history.  She initially had a septic bursitis of the right elbow in December 2022 which self expressed and was ultimately treated with antibiotics, but a wound persisted.  She then sustained a fall in January 2023 landing on the same right elbow causing the wound to open up further.  She was evaluated at a local hospital and was determined to need an operative washout of the right elbow by orthopedic surgery which was performed.  Initially after being discharged from the hospital she followed up with the orthopedic surgeon.  She then was seen at the [facility] wound clinic on March 20, 2023.  She reports she has not seen the orthopedic surgeon since February 2023 and does not have another appointment scheduled with the orthopedic surgeon.      Today she is again accompanied to the wound clinic by her PCA and they both provide portions of the interval history.    The patient reports since her last clinic visit with me she had a skin reaction to the Mepilex and stopped using that.  Recently she has been bandaging the wound with endoform, Adaptic and an ABD pad all changed every other day.  There is been light serous drainage from the wound.  There is been no difficulties with the dressing changes.      She has been using elbow immobilizer for tennis elbow.      The pateint denies fevers or chills.  They report the pain from the wound has been 0/10 and has remained about the same recently.      Today the patient reports maintaining a high protein diet and  taking protein supplements regularly.        The patient denies a history of diabetes or chronic steroid use.  Today she reports she has not been smoking recently but does occasionally vape.  She reports today that she is still vaping about the same amount that she was at her last clinic visit with me.    The patient has not had any symptoms of infection relating to the wound recently and is not currently on antibiotics.       Problem List:   Past Medical History:   Diagnosis Date    Chronic pain     COPD (chronic obstructive pulmonary disease) (H)     DJD (degenerative joint disease)     Hepatitis     Major depression, recurrent (H24)     Sleep apnea     have C-PAP,But don't really use it.              Family Hx: family history includes Asthma in her son; Cardiovascular in her maternal grandfather and mother.       Surgical Hx:   Past Surgical History:   Procedure Laterality Date    APPENDECTOMY      COLONOSCOPY      GYN SURGERY      tubal ligation    HEAD & NECK SURGERY      teeth ext    RECESSION RESECTION (REPAIR STRABISMUS) Right 10/22/2015    Procedure: RECESSION RESECTION (REPAIR STRABISMUS);  Surgeon: Hilario Hills MD;  Location: [facility]          Allergies:    Allergies   Allergen Reactions    Nefazodone     Nsaids      Has GFR <60    Tramadol      Seizures    Trazodone               Medication History:    Current Outpatient Medications   Medication Sig    Acetaminophen (TYLENOL PO) Take 500 mg by mouth every 6 hours as needed for mild pain or fever    albuterol (PROAIR HFA, PROVENTIL HFA, VENTOLIN HFA) 108 (90 BASE) MCG/ACT inhaler Inhale 2 puffs into the lungs every 4 hours as needed for shortness of breath / dyspnea or wheezing    amLODIPine (NORVASC) 5 MG tablet Take 5 mg by mouth daily Take 7.5mg daily    amLODIPine (NORVASC) 5 MG tablet     aspirin (ASA) 81 MG chewable tablet CHEW AND SWALLOW 1 TABLET(81 MG) BY MOUTH DAILY    atorvastatin (LIPITOR) 20 MG tablet     BuPROPion HCl (WELLBUTRIN  XL PO) Take 150 mg by mouth daily    calcium carb 1250 mg, 500 mg [place],/vitamin D 200 units (OSCAL WITH D) 500-200 MG-UNIT per tablet Take 1 tablet by mouth 2 times daily (with meals)    cyclobenzaprine (FLEXERIL) 10 MG tablet     desonide (DESOWEN) 0.05 % ointment Apply topically 2 times daily    diazepam (VALIUM) 5 MG tablet Take one tablet 30 minutes prior lumbar injection due to severe anxiety    ferrous sulfate (FEROSUL) 325 (65 Fe) MG tablet Take 325 mg by mouth    FLUoxetine (PROZAC) 10 MG capsule     Gabapentin (NEURONTIN PO) Take 800 mg by mouth 3 times daily    gabapentin (NEURONTIN) 800 MG tablet     hydrOXYzine (ATARAX) 25 MG tablet TAKE 1 TO 2 TABLETS(25 TO 50 MG) BY MOUTH EVERY 6 HOURS AS NEEDED FOR ANXIETY    ibuprofen (ADVIL/MOTRIN) 600 MG tablet     IBUPROFEN 800 MG OR TABS 1 TABLET  DAILY AS NEEDED    ipratropium - albuterol 0.5 mg/2.5 mg/3 mL (DUONEB) 0.5-2.5 (3) MG/3ML nebulization Take 1 vial by nebulization as needed for shortness of breath or wheezing    melatonin 3 MG tablet Take 3 mg by mouth    Methadone HCl (METHADOSE PO) Take 100 mg by mouth    mometasone-formoterol (DULERA) 200-5 MCG/ACT oral inhaler Inhale 2 puffs into the lungs 2 times daily    Multiple Vitamins-Minerals (THERAPEUTIC-M) TABS Take 1 tablet by mouth daily    naloxone (NARCAN) 4 MG/0.1ML nasal spray Gently insert the tip of the nozzle into either nostril. Press the plunger firmly to give dose; remove device from nose. If no reaction in 2-3 minutes, give a second dose. Call 911 to prevent rebound overdose.    nitroGLYcerin (NITROSTAT) 0.4 MG sublingual tablet DISSOLVE 1 TABLET UNDER THE TONGUE AS NEEDED FOR CHEST PAIN    VITAMIN D, CHOLECALCIFEROL, PO Take 1,000 Units by mouth daily    XANAX 1 MG OR TABS      No current facility-administered medications for this visit.         Tobacco History:  reports that she has been smoking cigarettes and vaping device. She has been smoking an average of 1 pack per day. She has never  used smokeless tobacco.       REVIEW OF SYMPTOMS:   The review of systems was negative except as noted in the HPI.           PHYSICAL EXAMINATION:     /62   Pulse 70   Temp 98.3  F (36.8  C)   Resp 16   LMP 04/20/2008   SpO2 94%            GENERAL: The patient overall appears well and is no acute distress.   HEAD: normocephalic   EYES: Sclera and conjunctiva clear   NECK: no obvious masses   LUNGS: breathing is unlabored.   EXTREMITIES: No clubbing, cyanosis or edema   SKIN: No rashes or other abnormalities except as noted under the Wound section below.   NEUROLOGICAL: normal motor and sensory function       WOUND/ulcer: The wound appears healthy with no sign of infection.   Wound bed: granulation tissue  Periwound: healthy intact skin  Today all the measurements are improved compared with her last clinic visit, the wound is healing well.      Also see below for wound details:     Circumferential volume measures:             No data to display                Ulceration(s)/Wound(s):   Please see the media tab under the chart review for pictures of the wounds.  Nursing staff removed dressings and cleansed wound.    VASC Wound right elbow (Active)   Pre Size Length 1.4 01/25/24 1300   Pre Size Width 1.1 01/25/24 1300   Pre Size Depth 0.2 01/25/24 1300   Undermined 1cm at 10-1 o clock 01/25/24 1300                       No results for input(s): HGBA1C, A1C, EAG in the last 34131 hours.    Invalid input(s): [id number]       Recent Labs   Lab Test 07/02/21  0510   ALBUMIN 3.5              No debridement performed today.                  ASSESSMENT:   This is a 68 year old female with a right elbow surgical wound          PLAN:   They can continue using endoform, Adaptic and an ABD pad.  Changed every other day.  I have encouraged her to continue to use the sling to minimize movement of the elbow joint.    I have encouraged her to be vigilant to make sure there is no pressure or friction applied to this area  throughout the day.    I have encouraged the patient to continue on their high protein diet to aid in wound healing.    I have also encouraged her to minimize nicotine use, nicotine slows wound healing.  The patient will return to the wound clinic in 4-6 weeks to see me again.        30 minutes spent on the date of the encounter doing chart review, history and exam, documentation and further activities per the note, this time excludes any procedure time      Kojo Osborn MD  01/25/2024   2:09 PM   [facility]  [phone]    This note was electronically signed by Kojo Osborn MD

## 2024-02-27 NOTE — PATIENT INSTRUCTIONS
Wound care supplies were ordered today through [employer] and if you are not receiving your supplies or have a question on your bill please contact Nata Squires at [phone]. Please allow 2-5 business days for delivery of supplies. You may get a call from a [phone] if there are additional information Jeff needs. It is important to  or return their call. PLEASE NOTE: If you need to return your supplies, you MUST call customer service within 15 days of delivery date.         Wound Care Instructions    EVERY OTHER DAY and as needed, Cleanse your right elbow wound(s) with Normal saline.    Pat Dry with non-sterile gauze    Apply Lotion to the intact skin surrounding your wound and other dry skin locations. Some good lotions include: Remedy Skin Repair Cream, Sarna, Vanicream or Cetaphil    Primary Dressing: Apply endoform and hydrofera blue     Secondary dressing: ABD pad  and secure with roll gauze    It is ok to get your wound wet in the bath or shower      If for some reason you are not able to get your dressing(s) changed as outlined above (due to illness, lack of supplies, lack of help) please do the following: remove old, soiled dressings; wash the wounds with saline; pat dry; apply ABD pad or other absorbant pad and secure with rolled gauze; avoid tape directly on your skin; Call the clinic as soon as possible to let us know what the current issues are in receiving wound care [phone].      SEEK MEDICAL CARE IF:  You have an increase in swelling, pain, or redness around the wound.  You have an increase in the amount of pus coming from the wound.  There is a bad smell coming from the wound.  The wound appears to be worsening/enlarging  You have a fever greater than 101.5 F      It is ok to continue current wound care treatment/products for the next 2-3 days until new wound care supplies are ordered and arrive. If longer than this please contact our office at [phone].    If you have a 2 layer or  4 layer compression wrap on these are safe to have on for ONLY 7 days. If for some reason you are not able to get the wrap(s) changed (due to illness; lack of supplies, lack of help, lack of transportation) please do the following: unwrap the old 2 or 4 layer compression wrap; avoid using scissors as you could cut your skin and cause wounds; use tubular compression when available. Call to reschedule your home care or clinic visit appointment as soon as possible.    Please NOTE: if you are 15 minutes late to your clinic appointment you will have to be rescheduled. Please call our clinic as soon as possible if you know you will not be able to get to your appointment at [phone].    If you fail to show up to 3 scheduled clinic appointments you will be dismissed from our clinic.              We want to hear from you!  In the next few weeks, you should receive a call or email to complete a survey about your visit at [facility]. Please help us improve your appointment experience by letting us know how we did today. We strive to make your experience good and value any ways in which we could do better.      We value your input and suggestions.    Thank you for choosing the [facility]!

## 2024-03-07 ENCOUNTER — OFFICE VISIT (OUTPATIENT)
Dept: VASCULAR SURGERY | Facility: CLINIC | Age: 69
End: 2024-03-07
Attending: FAMILY MEDICINE
Payer: COMMERCIAL

## 2024-03-07 VITALS — DIASTOLIC BLOOD PRESSURE: 93 MMHG | HEART RATE: 83 BPM | SYSTOLIC BLOOD PRESSURE: 145 MMHG | OXYGEN SATURATION: 92 %

## 2024-03-07 DIAGNOSIS — T81.89XD NON-HEALING SURGICAL WOUND, SUBSEQUENT ENCOUNTER: Primary | ICD-10-CM

## 2024-03-07 PROCEDURE — 99214 OFFICE O/P EST MOD 30 MIN: CPT | Performed by: FAMILY MEDICINE

## 2024-03-07 PROCEDURE — G0463 HOSPITAL OUTPT CLINIC VISIT: HCPCS | Performed by: FAMILY MEDICINE

## 2024-03-07 ASSESSMENT — PAIN SCALES - GENERAL: PAINLEVEL: NO PAIN (0)

## 2024-03-07 NOTE — LETTER
[facility]  [address] [place] [address] [address]  [place], [place] [address]  [phone]      Fax [phone]    [facility]           Fax: [phone]            Customer Service: [phone]        Account #: [id number]    Wound Dressing Rx and Order Form  Order Status: Reorder  Verbal: Lilliam  Date: 2024     Cynthia Chawla  Gender: female  : 1955  [address]    [facility] [place] [address]  [phone] (home)     Medical Record: [id number]  Primary Care Provider: Em Kaufman      ICD-10-CM    1. Non-healing surgical wound, subsequent encounter  T81.89XD Wound care            Insurance Info:  INSURER: Payor: MEDICA / Plan: MEDICA [employer] / Product Type: Indemnity /   Policy ID#:  [id number]  SECONDARY INSURANCE:    Secondary Policy ID#:  N/A        Physician Info:   Name:  MIKE DALEY     Dept Address/Phones:   [address] [place] [address], [address]  [facility] [id number] [phone]  Fax: [phone]    Lymphedema circumferential measurements (in cm):       No data to display                  Wound info:  VASC Wound right elbow (Active)   Number of days: 288       VASC Wound right elbow (Active)   Pre Size Length 1.8 24 1100   Pre Size Width 0.9 24 1100   Pre Size Depth 0.2 24 1100   Pre Total Sq cm 1.62 24 1100   Post Size Length 2 23 0900   Post Size Width 2 23 0900   Post Size Depth 0.2 23 0900   Post Total Sq cm 4 23 0900   Undermined 1.2 cm @11-1 24 1100   Number of days: 288        Drainage: moderate  Thickness:  full  Duration of Need: 30 DAYS  Days Supply: 30 DAYS  Start Date: 3/7/2024  Starter Kit, Ancillary Kit (saline, gloves, gauze): Yes   Qualifying wound/Debridement: Yes     NO SUBSTITUTIONS. Call [phone].      Dressing Type Brand Size Frequency of change  Quantity   Primary Endoform  2 x 2 in EVERY OTHER DAY and as needed Send 4   Secondary Gauze   4 x 4 in EVERY OTHER DAY  And as needed Send 2 loafs   Tape Paper  1 in EVERY OTHER DAY  And as needed Send 2     NO SUBSTITUTIONS. Call [phone] with questions.       OK to forward to covered supplier.    Electronically Signed Physician:  MIKE DALEY             Date: March 7, 2024

## 2024-03-07 NOTE — PROGRESS NOTES
Wound Clinic Note          Visit date: 03/07/2024       Cheif Complaint:     Cynthia Chawla is a 68 year old female had concerns including Wound Check (Right elbow wound).  She has a right elbow wound.      HISTORY OF PRESENT ILLNESS:    Cynthia Chawla reports the ulcer has been present since December 2022.  The wound began after a recent surgery and the incision did not heal normally.  She was accompanied to the wound clinic for initial evaluation by her PCA who provided portions of the history.  She initially had a septic bursitis of the right elbow in December 2022 which self expressed and was ultimately treated with antibiotics, but a wound persisted.  She then sustained a fall in January 2023 landing on the same right elbow causing the wound to open up further.  She was evaluated at a local hospital and was determined to need an operative washout of the right elbow by orthopedic surgery which was performed.  Initially after being discharged from the hospital she followed up with the orthopedic surgeon.  She then was seen at the [facility] wound clinic on March 20, 2023.  She reports she has not seen the orthopedic surgeon since February 2023 and does not have another appointment scheduled with the orthopedic surgeon.      Today she is again accompanied to the wound clinic by her PCA and they both provide portions of the interval history.    Recently she has been bandaging the wound with endoform, Adaptic and an ABD pad all changed every other day.  There is been light serous drainage from the wound.  There is been no difficulties with the dressing changes.      She has been using elbow immobilizer for tennis elbow.      The pateint denies fevers or chills.  They report the pain from the wound has been 0/10 and has remained about the same recently.      Today the patient reports maintaining a high protein diet and taking protein supplements regularly.        The patient denies a history of diabetes or chronic  steroid use.  Today she reports she has not been smoking recently but does occasionally vape.  She reports today that she is still vaping about the same amount that she was at her last clinic visit with me.    The patient has not had any symptoms of infection relating to the wound recently and is not currently on antibiotics.       Problem List:   Past Medical History:   Diagnosis Date    Chronic pain     COPD (chronic obstructive pulmonary disease) (H)     DJD (degenerative joint disease)     Hepatitis     Major depression, recurrent (H24)     Sleep apnea     have C-PAP,But don't really use it.              Family Hx: family history includes Asthma in her son; Cardiovascular in her maternal grandfather and mother.       Surgical Hx:   Past Surgical History:   Procedure Laterality Date    APPENDECTOMY      COLONOSCOPY      GYN SURGERY      tubal ligation    HEAD & NECK SURGERY      teeth ext    RECESSION RESECTION (REPAIR STRABISMUS) Right 10/22/2015    Procedure: RECESSION RESECTION (REPAIR STRABISMUS);  Surgeon: Hilario Hills MD;  [facility]: [facility]          Allergies:    Allergies   Allergen Reactions    Nefazodone     Nsaids      Has GFR <60    Tramadol      Seizures    Trazodone               Medication History:    Current Outpatient Medications   Medication Sig    Acetaminophen (TYLENOL PO) Take 500 mg by mouth every 6 hours as needed for mild pain or fever    albuterol (PROAIR HFA, PROVENTIL HFA, VENTOLIN HFA) 108 (90 BASE) MCG/ACT inhaler Inhale 2 puffs into the lungs every 4 hours as needed for shortness of breath / dyspnea or wheezing    amLODIPine (NORVASC) 5 MG tablet Take 5 mg by mouth daily Take 7.5mg daily    amLODIPine (NORVASC) 5 MG tablet     aspirin (ASA) 81 MG chewable tablet CHEW AND SWALLOW 1 TABLET(81 MG) BY MOUTH DAILY    atorvastatin (LIPITOR) 20 MG tablet     BuPROPion HCl (WELLBUTRIN XL PO) Take 150 mg by mouth daily    calcium carb 1250 mg, 500 mg [place],/vitamin D 200 units (OSCAL  WITH D) 500-200 MG-UNIT per tablet Take 1 tablet by mouth 2 times daily (with meals)    cyclobenzaprine (FLEXERIL) 10 MG tablet     desonide (DESOWEN) 0.05 % ointment Apply topically 2 times daily    diazepam (VALIUM) 5 MG tablet Take one tablet 30 minutes prior lumbar injection due to severe anxiety    ferrous sulfate (FEROSUL) 325 (65 Fe) MG tablet Take 325 mg by mouth    FLUoxetine (PROZAC) 10 MG capsule     Gabapentin (NEURONTIN PO) Take 800 mg by mouth 3 times daily    gabapentin (NEURONTIN) 800 MG tablet     hydrOXYzine (ATARAX) 25 MG tablet TAKE 1 TO 2 TABLETS(25 TO 50 MG) BY MOUTH EVERY 6 HOURS AS NEEDED FOR ANXIETY    ibuprofen (ADVIL/MOTRIN) 600 MG tablet     IBUPROFEN 800 MG OR TABS 1 TABLET  DAILY AS NEEDED    ipratropium - albuterol 0.5 mg/2.5 mg/3 mL (DUONEB) 0.5-2.5 (3) MG/3ML nebulization Take 1 vial by nebulization as needed for shortness of breath or wheezing    melatonin 3 MG tablet Take 3 mg by mouth    Methadone HCl (METHADOSE PO) Take 100 mg by mouth    mometasone-formoterol (DULERA) 200-5 MCG/ACT oral inhaler Inhale 2 puffs into the lungs 2 times daily    Multiple Vitamins-Minerals (THERAPEUTIC-M) TABS Take 1 tablet by mouth daily    naloxone (NARCAN) 4 MG/0.1ML nasal spray Gently insert the tip of the nozzle into either nostril. Press the plunger firmly to give dose; remove device from nose. If no reaction in 2-3 minutes, give a second dose. Call 911 to prevent rebound overdose.    nitroGLYcerin (NITROSTAT) 0.4 MG sublingual tablet DISSOLVE 1 TABLET UNDER THE TONGUE AS NEEDED FOR CHEST PAIN    VITAMIN D, CHOLECALCIFEROL, PO Take 1,000 Units by mouth daily    XANAX 1 MG OR TABS      No current facility-administered medications for this visit.         Tobacco History:  reports that she has been smoking cigarettes and vaping device. She has been smoking an average of 1 pack per day. She has never used smokeless tobacco.       REVIEW OF SYMPTOMS:   The review of systems was negative except as  noted in the HPI.           PHYSICAL EXAMINATION:     BP (!) 145/93   Pulse 83   LMP 04/20/2008   SpO2 92%            GENERAL: The patient overall appears well and is no acute distress.   HEAD: normocephalic   EYES: Sclera and conjunctiva clear   NECK: no obvious masses   LUNGS: breathing is unlabored.   EXTREMITIES: No clubbing, cyanosis or edema   SKIN: No rashes or other abnormalities except as noted under the Wound section below.   NEUROLOGICAL: normal motor and sensory function       WOUND/ulcer: The wound appears healthy with no sign of infection.   Wound bed: granulation tissue  Periwound: healthy intact skin  Today the overall surface area measurement is slightly less compared with her last clinic visit but the undermining is still about the same.      Also see below for wound details:     Circumferential volume measures:             No data to display                Ulceration(s)/Wound(s):   Please see the media tab under the chart review for pictures of the wounds.  Nursing staff removed dressings and cleansed wound.    VASC Wound right elbow (Active)   Pre Size Length 1.8 03/07/24 1100   Pre Size Width 0.9 03/07/24 1100   Pre Size Depth 0.2 03/07/24 1100   Pre Total Sq cm 1.62 03/07/24 1100   Undermined 1.2 cm @11-1 03/07/24 1100                         No results for input(s): HGBA1C, A1C, EAG in the last 72572 hours.    Invalid input(s): [id number]       Recent Labs   Lab Test 07/02/21  0510   ALBUMIN 3.5              No debridement performed today.                  ASSESSMENT:   This is a 68 year old female with a right elbow surgical wound          PLAN:   They can continue using endoform, Hydrofera Blue and an ABD pad.  Changed every other day.  I did recommend that we go back to using the Hydrofera Blue over the endoform to get that added benefit.  I have encouraged her to continue to use the sling to minimize movement of the elbow joint.    I have encouraged her to be vigilant to make sure  there is no pressure or friction applied to this area throughout the day.    I have encouraged the patient to continue on their high protein diet to aid in wound healing.    I have also encouraged her to minimize nicotine use, nicotine slows wound healing.  The patient will return to the wound clinic in 4-6 weeks to see me again.        30 minutes spent on the date of the encounter doing chart review, history and exam, documentation and further activities per the note, this time excludes any procedure time      Kojo Osborn MD  03/07/2024   11:41 AM   [facility]  [phone]    This note was electronically signed by Kojo Osborn MD